# Patient Record
Sex: MALE | Race: WHITE | NOT HISPANIC OR LATINO | URBAN - METROPOLITAN AREA
[De-identification: names, ages, dates, MRNs, and addresses within clinical notes are randomized per-mention and may not be internally consistent; named-entity substitution may affect disease eponyms.]

---

## 2018-08-01 ENCOUNTER — OUTPATIENT (OUTPATIENT)
Dept: OUTPATIENT SERVICES | Facility: HOSPITAL | Age: 48
LOS: 1 days | End: 2018-08-01
Payer: MEDICAID

## 2018-08-01 ENCOUNTER — EMERGENCY (EMERGENCY)
Facility: HOSPITAL | Age: 48
LOS: 1 days | Discharge: HOME | End: 2018-08-01
Attending: EMERGENCY MEDICINE | Admitting: EMERGENCY MEDICINE
Payer: MEDICAID

## 2018-08-01 VITALS
RESPIRATION RATE: 16 BRPM | SYSTOLIC BLOOD PRESSURE: 139 MMHG | OXYGEN SATURATION: 100 % | DIASTOLIC BLOOD PRESSURE: 89 MMHG | HEART RATE: 97 BPM | TEMPERATURE: 99 F

## 2018-08-01 DIAGNOSIS — F10.10 ALCOHOL ABUSE, UNCOMPLICATED: ICD-10-CM

## 2018-08-01 DIAGNOSIS — F32.9 MAJOR DEPRESSIVE DISORDER, SINGLE EPISODE, UNSPECIFIED: ICD-10-CM

## 2018-08-01 LAB
ALBUMIN SERPL ELPH-MCNC: 4.9 G/DL — SIGNIFICANT CHANGE UP (ref 3.3–5)
ALP SERPL-CCNC: 153 U/L — HIGH (ref 40–120)
ALT FLD-CCNC: 105 U/L — HIGH (ref 4–41)
AMPHET UR-MCNC: NEGATIVE — SIGNIFICANT CHANGE UP
APAP SERPL-MCNC: < 15 UG/ML — LOW (ref 15–25)
APPEARANCE UR: CLEAR — SIGNIFICANT CHANGE UP
AST SERPL-CCNC: 180 U/L — HIGH (ref 4–40)
BARBITURATES UR SCN-MCNC: NEGATIVE — SIGNIFICANT CHANGE UP
BASOPHILS # BLD AUTO: 0.07 K/UL — SIGNIFICANT CHANGE UP (ref 0–0.2)
BASOPHILS NFR BLD AUTO: 1.1 % — SIGNIFICANT CHANGE UP (ref 0–2)
BENZODIAZ UR-MCNC: NEGATIVE — SIGNIFICANT CHANGE UP
BILIRUB SERPL-MCNC: 0.4 MG/DL — SIGNIFICANT CHANGE UP (ref 0.2–1.2)
BILIRUB UR-MCNC: NEGATIVE — SIGNIFICANT CHANGE UP
BLOOD UR QL VISUAL: NEGATIVE — SIGNIFICANT CHANGE UP
BUN SERPL-MCNC: 3 MG/DL — LOW (ref 7–23)
CALCIUM SERPL-MCNC: 9 MG/DL — SIGNIFICANT CHANGE UP (ref 8.4–10.5)
CANNABINOIDS UR-MCNC: POSITIVE — SIGNIFICANT CHANGE UP
CHLORIDE SERPL-SCNC: 103 MMOL/L — SIGNIFICANT CHANGE UP (ref 98–107)
CO2 SERPL-SCNC: 24 MMOL/L — SIGNIFICANT CHANGE UP (ref 22–31)
COCAINE METAB.OTHER UR-MCNC: NEGATIVE — SIGNIFICANT CHANGE UP
COLOR SPEC: SIGNIFICANT CHANGE UP
CREAT SERPL-MCNC: 0.58 MG/DL — SIGNIFICANT CHANGE UP (ref 0.5–1.3)
EOSINOPHIL # BLD AUTO: 0.08 K/UL — SIGNIFICANT CHANGE UP (ref 0–0.5)
EOSINOPHIL NFR BLD AUTO: 1.2 % — SIGNIFICANT CHANGE UP (ref 0–6)
ETHANOL BLD-MCNC: 238 MG/DL — HIGH
GLUCOSE SERPL-MCNC: 98 MG/DL — SIGNIFICANT CHANGE UP (ref 70–99)
GLUCOSE UR-MCNC: NEGATIVE — SIGNIFICANT CHANGE UP
HCT VFR BLD CALC: 46.6 % — SIGNIFICANT CHANGE UP (ref 39–50)
HGB BLD-MCNC: 15.3 G/DL — SIGNIFICANT CHANGE UP (ref 13–17)
IMM GRANULOCYTES # BLD AUTO: 0.02 # — SIGNIFICANT CHANGE UP
IMM GRANULOCYTES NFR BLD AUTO: 0.3 % — SIGNIFICANT CHANGE UP (ref 0–1.5)
KETONES UR-MCNC: NEGATIVE — SIGNIFICANT CHANGE UP
LEUKOCYTE ESTERASE UR-ACNC: NEGATIVE — SIGNIFICANT CHANGE UP
LYMPHOCYTES # BLD AUTO: 1.94 K/UL — SIGNIFICANT CHANGE UP (ref 1–3.3)
LYMPHOCYTES # BLD AUTO: 29.2 % — SIGNIFICANT CHANGE UP (ref 13–44)
MCHC RBC-ENTMCNC: 30.6 PG — SIGNIFICANT CHANGE UP (ref 27–34)
MCHC RBC-ENTMCNC: 32.8 % — SIGNIFICANT CHANGE UP (ref 32–36)
MCV RBC AUTO: 93.2 FL — SIGNIFICANT CHANGE UP (ref 80–100)
METHADONE UR-MCNC: NEGATIVE — SIGNIFICANT CHANGE UP
MONOCYTES # BLD AUTO: 0.46 K/UL — SIGNIFICANT CHANGE UP (ref 0–0.9)
MONOCYTES NFR BLD AUTO: 6.9 % — SIGNIFICANT CHANGE UP (ref 2–14)
NEUTROPHILS # BLD AUTO: 4.07 K/UL — SIGNIFICANT CHANGE UP (ref 1.8–7.4)
NEUTROPHILS NFR BLD AUTO: 61.3 % — SIGNIFICANT CHANGE UP (ref 43–77)
NITRITE UR-MCNC: NEGATIVE — SIGNIFICANT CHANGE UP
NRBC # FLD: 0 — SIGNIFICANT CHANGE UP
OPIATES UR-MCNC: NEGATIVE — SIGNIFICANT CHANGE UP
OXYCODONE UR-MCNC: NEGATIVE — SIGNIFICANT CHANGE UP
PCP UR-MCNC: NEGATIVE — SIGNIFICANT CHANGE UP
PH UR: 6 — SIGNIFICANT CHANGE UP (ref 4.6–8)
PLATELET # BLD AUTO: 160 K/UL — SIGNIFICANT CHANGE UP (ref 150–400)
PMV BLD: 10.5 FL — SIGNIFICANT CHANGE UP (ref 7–13)
POTASSIUM SERPL-MCNC: 3.9 MMOL/L — SIGNIFICANT CHANGE UP (ref 3.5–5.3)
POTASSIUM SERPL-SCNC: 3.9 MMOL/L — SIGNIFICANT CHANGE UP (ref 3.5–5.3)
PROT SERPL-MCNC: 7.8 G/DL — SIGNIFICANT CHANGE UP (ref 6–8.3)
PROT UR-MCNC: NEGATIVE MG/DL — SIGNIFICANT CHANGE UP
RBC # BLD: 5 M/UL — SIGNIFICANT CHANGE UP (ref 4.2–5.8)
RBC # FLD: 14.6 % — HIGH (ref 10.3–14.5)
SALICYLATES SERPL-MCNC: < 5 MG/DL — LOW (ref 15–30)
SODIUM SERPL-SCNC: 144 MMOL/L — SIGNIFICANT CHANGE UP (ref 135–145)
SP GR SPEC: 1 — LOW (ref 1–1.04)
TSH SERPL-MCNC: 1.47 UIU/ML — SIGNIFICANT CHANGE UP (ref 0.27–4.2)
UROBILINOGEN FLD QL: NORMAL MG/DL — SIGNIFICANT CHANGE UP
WBC # BLD: 6.64 K/UL — SIGNIFICANT CHANGE UP (ref 3.8–10.5)
WBC # FLD AUTO: 6.64 K/UL — SIGNIFICANT CHANGE UP (ref 3.8–10.5)
WBC UR QL: SIGNIFICANT CHANGE UP (ref 0–?)

## 2018-08-01 PROCEDURE — 90792 PSYCH DIAG EVAL W/MED SRVCS: CPT | Mod: GC

## 2018-08-01 PROCEDURE — 99283 EMERGENCY DEPT VISIT LOW MDM: CPT

## 2018-08-01 NOTE — ED BEHAVIORAL HEALTH ASSESSMENT NOTE - CASE SUMMARY
IN BRIEF, 48 yo M with longstanding alcoholism and objective signs of liver damage, presenting with worsening depressive symptoms.  States that he feels very stressed at home caring for father and also reports that he wants to get help for his drinking problems  States that he felt desperate earlier today but feels safe now.  MSE is notable for his being clinical sober, despite having an elevated BAL.  Pleasant, red, rashes on skin,no PMA or PMR, mood "ok" affect: normothymic, somewhat constricted, no current SI, HI, AH or VH. Some insight and judgment.  Plan is for discharge and  referral for outpatient behavioral health.

## 2018-08-01 NOTE — ED BEHAVIORAL HEALTH ASSESSMENT NOTE - SUMMARY
46yo M single, domiciled with elderly father, unemployed (previously at [employer]), with no PMHx, EtOH abuse (drinks 14 beers per day, blood etoh 238 in the ED, elevated LFTs ), with no formal PPHx (no prior hospitalizations, no hx of SAs, no previous med trials) but saw a therapist many years ago and superficially cut his abdomen 8yrs ago (NSSIB), BIB EMS activated by self after he put a knife to his heart 2/2 SI also in the context of EtOH intoxication and several psychosocial stressors. Pt is clearly depressed but lacks suicidal intent and is help-seeking/future-oriented. He is open to both psychiatric and substance use treatment. No indication for psychiatric hospitalization, will discharge home and refer for outpt f/u.

## 2018-08-01 NOTE — ED PROVIDER NOTE - OBJECTIVE STATEMENT
pt here with suicidal ideation.  Was going to cut himself with a knife-  held knife to his chest.   has cut himself on one prior occasion.  he notes depression over failure to be able to obtain help from his family in caring for his father  denies medical history but drinks daily.  No h/o withdrawl

## 2018-08-01 NOTE — ED BEHAVIORAL HEALTH ASSESSMENT NOTE - SAFETY PLAN DETAILS
pt agreeable to go to [facility], call 911, or return to the ED with any imminent safety concerns

## 2018-08-01 NOTE — ED BEHAVIORAL HEALTH ASSESSMENT NOTE - DESCRIPTION
calm and cooperative, tearful at times    Vital Signs Last 24 Hrs  T(C): 37.2 (01 Aug 2018 19:56), Max: 37.2 (01 Aug 2018 19:56)  T(F): 99 (01 Aug 2018 19:56), Max: 99 (01 Aug 2018 19:56)  HR: 97 (01 Aug 2018 19:56) (97 - 97)  BP: 139/89 (01 Aug 2018 19:56) (139/89 - 139/89)  BP(mean): --  RR: 16 (01 Aug 2018 19:56) (16 - 16)  SpO2: 100% (01 Aug 2018 19:56) (100% - 100%) none as per HPI

## 2018-08-01 NOTE — ED PROVIDER NOTE - MEDICAL DECISION MAKING DETAILS
Pt with Pt with  suicidal ideation  pt also with active ETOH abuse  screening labs sent psych consulted

## 2018-08-01 NOTE — ED ADULT NURSE NOTE - OBJECTIVE STATEMENT
RICHARD GRESHAM, pt called 911 for suicidal thoughts and depressed feelings. As per EMS and pt, he held a knife to his chest. Pt reports feeling "stressed" r/t multiple "family stressors". Pt reports chronic ETOH intake, daily "multiple beers" for 30 plus years". Pt reports last drink was 2pm this afternoon, denies Hx of seizures or w/d, states "I have never really been sober enough to withdraw". Pt reports prior hx of SA by stabbing self in the stomach years prior with 1-2 prior psychiatric admissions.

## 2018-08-01 NOTE — ED BEHAVIORAL HEALTH ASSESSMENT NOTE - SUICIDE PROTECTIVE FACTORS
Future oriented/Identifies reasons for living/Responsibility to family and others/Supportive social network or family

## 2018-08-01 NOTE — ED BEHAVIORAL HEALTH NOTE - BEHAVIORAL HEALTH NOTE
met with the patient and provided referrals and psychoeducation for substance abuse services. He identifed [facility] as a suitable program, and stated he will call for an appointment there.  sent and email to staff there informing them of same. The evaluating psychiatrist provided the patient with a  referral to the [facility], which the patient stated he will go to tomorrow. He stated he has the means to get home on his own.

## 2018-08-01 NOTE — ED ADULT NURSE NOTE - NSIMPLEMENTINTERV_GEN_ALL_ED
Implemented All Universal Safety Interventions:  Lynn to call system. Call bell, personal items and telephone within reach. Instruct patient to call for assistance. Room bathroom lighting operational. Non-slip footwear when patient is off stretcher. Physically safe environment: no spills, clutter or unnecessary equipment. Stretcher in lowest position, wheels locked, appropriate side rails in place.

## 2018-08-01 NOTE — ED ADULT NURSE REASSESSMENT NOTE - GENERAL PATIENT STATE
comfortable appearance/cooperative/pt remains awake, a&ox3, calm. Denies current suicidal thoughts or intent to harm self.

## 2018-08-01 NOTE — ED BEHAVIORAL HEALTH ASSESSMENT NOTE - HPI (INCLUDE ILLNESS QUALITY, SEVERITY, DURATION, TIMING, CONTEXT, MODIFYING FACTORS, ASSOCIATED SIGNS AND SYMPTOMS)
Pt is a 46yo M single, domiciled with elderly father, unemployed (previously at [employer]), with no PMHx, EtOH abuse (drinks 14 beers per day), with no formal PPHx (no prior hospitalizations, no hx of SAs, no previous med trials) but saw a therapist approx 8 years ago and superficially cut his abdomen 8yrs ago (NSSIB), BIB EMS activated by self after he put a knife to his heart 2/2 SI also in the context of EtOH intoxication. Pt states that for the past several years he has been feeling very overwhelmed and isolated/alone caring for his father. Has chronic conflict with his sister and other family members, who have not been supportive or helpful. Reports feeling depressed everyday for several months although does have moments of happiness, enjoys watching soccer and recent world cup. No longer enjoys playing the guitar or cooking. Reports losing approx 50lbs in the past 8 months due to decreased appetite. Sleeping poorly (2:00am - 10:30am, but wakes frequently during the night). Reports chronic SI for many years but states he would never actually kill himself. States he's very close to his father, who is quite supportive. Pt hopeful to get better and despite perceived stigma about psychiatry, is open to engaging in outpt treatment.

## 2018-08-01 NOTE — ED BEHAVIORAL HEALTH ASSESSMENT NOTE - RISK ASSESSMENT
Risk factors include depression, chronic SI (w/ suicidal gesture tonight - put knife to heart), EtOH abuse, and few social supports. Protective factors include pt denies any suicidal intent, has no hx of psych hospitalizations, is future-oriented and hopeful, is help-seeking (called 911 himself, open to referral for outpt psychiatric/substance tx), and has very supportive father and stable domicile. Protective factors mitigate risks and pt is appropriate for outpt referral.

## 2018-08-01 NOTE — ED PROVIDER NOTE - PROGRESS NOTE DETAILS
pt seen by psych  cleared  pt will follow up with ETOH abuse program  pt presently calm cooperative  nl HR  no tremor stable gait  clear coherent speech

## 2018-08-01 NOTE — ED ADULT TRIAGE NOTE - TEMPERATURE IN CELSIUS (DEGREES C)
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     -patient has no C Diff risk factors; denies recent abx or hospitalizations.   -follow up pending blood cultures.   -serial abdominal examinations and stool count 37.2

## 2018-08-13 ENCOUNTER — EMERGENCY (EMERGENCY)
Facility: HOSPITAL | Age: 48
LOS: 1 days | Discharge: ANOTHER HOSPITAL | End: 2018-08-13
Attending: EMERGENCY MEDICINE | Admitting: INTERNAL MEDICINE
Payer: MEDICAID

## 2018-08-13 VITALS
RESPIRATION RATE: 18 BRPM | SYSTOLIC BLOOD PRESSURE: 147 MMHG | HEART RATE: 85 BPM | TEMPERATURE: 99 F | DIASTOLIC BLOOD PRESSURE: 92 MMHG | OXYGEN SATURATION: 100 %

## 2018-08-13 DIAGNOSIS — F10.10 ALCOHOL ABUSE, UNCOMPLICATED: ICD-10-CM

## 2018-08-13 DIAGNOSIS — F10.230 ALCOHOL DEPENDENCE WITH WITHDRAWAL, UNCOMPLICATED: ICD-10-CM

## 2018-08-13 DIAGNOSIS — R45.851 SUICIDAL IDEATIONS: ICD-10-CM

## 2018-08-13 DIAGNOSIS — R69 ILLNESS, UNSPECIFIED: ICD-10-CM

## 2018-08-13 DIAGNOSIS — Z29.9 ENCOUNTER FOR PROPHYLACTIC MEASURES, UNSPECIFIED: ICD-10-CM

## 2018-08-13 DIAGNOSIS — T14.91XA SUICIDE ATTEMPT, INITIAL ENCOUNTER: ICD-10-CM

## 2018-08-13 DIAGNOSIS — F10.982 ALCOHOL USE, UNSPECIFIED WITH ALCOHOL-INDUCED SLEEP DISORDER: ICD-10-CM

## 2018-08-13 DIAGNOSIS — F39 UNSPECIFIED MOOD [AFFECTIVE] DISORDER: ICD-10-CM

## 2018-08-13 DIAGNOSIS — Z98.890 OTHER SPECIFIED POSTPROCEDURAL STATES: Chronic | ICD-10-CM

## 2018-08-13 LAB
ALBUMIN SERPL ELPH-MCNC: 4.6 G/DL — SIGNIFICANT CHANGE UP (ref 3.3–5)
ALP SERPL-CCNC: 145 U/L — HIGH (ref 40–120)
ALT FLD-CCNC: 89 U/L — HIGH (ref 4–41)
APAP SERPL-MCNC: < 15 UG/ML — LOW (ref 15–25)
AST SERPL-CCNC: 118 U/L — HIGH (ref 4–40)
BASOPHILS # BLD AUTO: 0.05 K/UL — SIGNIFICANT CHANGE UP (ref 0–0.2)
BASOPHILS NFR BLD AUTO: 1 % — SIGNIFICANT CHANGE UP (ref 0–2)
BILIRUB SERPL-MCNC: 0.5 MG/DL — SIGNIFICANT CHANGE UP (ref 0.2–1.2)
BUN SERPL-MCNC: 4 MG/DL — LOW (ref 7–23)
CALCIUM SERPL-MCNC: 8.7 MG/DL — SIGNIFICANT CHANGE UP (ref 8.4–10.5)
CHLORIDE SERPL-SCNC: 102 MMOL/L — SIGNIFICANT CHANGE UP (ref 98–107)
CO2 SERPL-SCNC: 23 MMOL/L — SIGNIFICANT CHANGE UP (ref 22–31)
CREAT SERPL-MCNC: 0.59 MG/DL — SIGNIFICANT CHANGE UP (ref 0.5–1.3)
EOSINOPHIL # BLD AUTO: 0.02 K/UL — SIGNIFICANT CHANGE UP (ref 0–0.5)
EOSINOPHIL NFR BLD AUTO: 0.4 % — SIGNIFICANT CHANGE UP (ref 0–6)
ETHANOL BLD-MCNC: 236 MG/DL — HIGH
GLUCOSE SERPL-MCNC: 95 MG/DL — SIGNIFICANT CHANGE UP (ref 70–99)
HCT VFR BLD CALC: 45.5 % — SIGNIFICANT CHANGE UP (ref 39–50)
HGB BLD-MCNC: 14.9 G/DL — SIGNIFICANT CHANGE UP (ref 13–17)
IMM GRANULOCYTES # BLD AUTO: 0.01 # — SIGNIFICANT CHANGE UP
IMM GRANULOCYTES NFR BLD AUTO: 0.2 % — SIGNIFICANT CHANGE UP (ref 0–1.5)
LYMPHOCYTES # BLD AUTO: 1.44 K/UL — SIGNIFICANT CHANGE UP (ref 1–3.3)
LYMPHOCYTES # BLD AUTO: 27.9 % — SIGNIFICANT CHANGE UP (ref 13–44)
MCHC RBC-ENTMCNC: 30.3 PG — SIGNIFICANT CHANGE UP (ref 27–34)
MCHC RBC-ENTMCNC: 32.7 % — SIGNIFICANT CHANGE UP (ref 32–36)
MCV RBC AUTO: 92.7 FL — SIGNIFICANT CHANGE UP (ref 80–100)
MONOCYTES # BLD AUTO: 0.4 K/UL — SIGNIFICANT CHANGE UP (ref 0–0.9)
MONOCYTES NFR BLD AUTO: 7.8 % — SIGNIFICANT CHANGE UP (ref 2–14)
NEUTROPHILS # BLD AUTO: 3.24 K/UL — SIGNIFICANT CHANGE UP (ref 1.8–7.4)
NEUTROPHILS NFR BLD AUTO: 62.7 % — SIGNIFICANT CHANGE UP (ref 43–77)
NRBC # FLD: 0 — SIGNIFICANT CHANGE UP
PLATELET # BLD AUTO: 148 K/UL — LOW (ref 150–400)
PMV BLD: 10.1 FL — SIGNIFICANT CHANGE UP (ref 7–13)
POTASSIUM SERPL-MCNC: 3.7 MMOL/L — SIGNIFICANT CHANGE UP (ref 3.5–5.3)
POTASSIUM SERPL-SCNC: 3.7 MMOL/L — SIGNIFICANT CHANGE UP (ref 3.5–5.3)
PROT SERPL-MCNC: 7.1 G/DL — SIGNIFICANT CHANGE UP (ref 6–8.3)
RBC # BLD: 4.91 M/UL — SIGNIFICANT CHANGE UP (ref 4.2–5.8)
RBC # FLD: 14.8 % — HIGH (ref 10.3–14.5)
SALICYLATES SERPL-MCNC: < 5 MG/DL — LOW (ref 15–30)
SODIUM SERPL-SCNC: 145 MMOL/L — SIGNIFICANT CHANGE UP (ref 135–145)
TSH SERPL-MCNC: 1.83 UIU/ML — SIGNIFICANT CHANGE UP (ref 0.27–4.2)
WBC # BLD: 5.16 K/UL — SIGNIFICANT CHANGE UP (ref 3.8–10.5)
WBC # FLD AUTO: 5.16 K/UL — SIGNIFICANT CHANGE UP (ref 3.8–10.5)

## 2018-08-13 PROCEDURE — 99285 EMERGENCY DEPT VISIT HI MDM: CPT

## 2018-08-13 RX ORDER — TRAZODONE HCL 50 MG
100 TABLET ORAL AT BEDTIME
Qty: 0 | Refills: 0 | Status: DISCONTINUED | OUTPATIENT
Start: 2018-08-13 | End: 2018-08-14

## 2018-08-13 RX ORDER — FOLIC ACID 0.8 MG
1 TABLET ORAL DAILY
Qty: 0 | Refills: 0 | Status: DISCONTINUED | OUTPATIENT
Start: 2018-08-13 | End: 2018-08-14

## 2018-08-13 RX ORDER — FOLIC ACID 0.8 MG
1 TABLET ORAL ONCE
Qty: 0 | Refills: 0 | Status: COMPLETED | OUTPATIENT
Start: 2018-08-13 | End: 2018-08-13

## 2018-08-13 RX ORDER — SODIUM CHLORIDE 9 MG/ML
1000 INJECTION, SOLUTION INTRAVENOUS
Qty: 0 | Refills: 0 | Status: DISCONTINUED | OUTPATIENT
Start: 2018-08-13 | End: 2018-08-14

## 2018-08-13 RX ORDER — THIAMINE MONONITRATE (VIT B1) 100 MG
100 TABLET ORAL DAILY
Qty: 0 | Refills: 0 | Status: DISCONTINUED | OUTPATIENT
Start: 2018-08-13 | End: 2018-08-14

## 2018-08-13 RX ORDER — THIAMINE MONONITRATE (VIT B1) 100 MG
100 TABLET ORAL ONCE
Qty: 0 | Refills: 0 | Status: COMPLETED | OUTPATIENT
Start: 2018-08-13 | End: 2018-08-13

## 2018-08-13 RX ORDER — ONDANSETRON 8 MG/1
4 TABLET, FILM COATED ORAL ONCE
Qty: 0 | Refills: 0 | Status: COMPLETED | OUTPATIENT
Start: 2018-08-13 | End: 2018-08-13

## 2018-08-13 RX ORDER — POTASSIUM CHLORIDE 20 MEQ
40 PACKET (EA) ORAL ONCE
Qty: 0 | Refills: 0 | Status: DISCONTINUED | OUTPATIENT
Start: 2018-08-13 | End: 2018-08-13

## 2018-08-13 RX ADMIN — Medication 50 MILLIGRAM(S): at 18:58

## 2018-08-13 RX ADMIN — Medication 1 TABLET(S): at 18:58

## 2018-08-13 RX ADMIN — Medication 100 MILLIGRAM(S): at 18:58

## 2018-08-13 RX ADMIN — SODIUM CHLORIDE 100 MILLILITER(S): 9 INJECTION, SOLUTION INTRAVENOUS at 18:58

## 2018-08-13 RX ADMIN — Medication 1 MILLIGRAM(S): at 18:58

## 2018-08-13 RX ADMIN — ONDANSETRON 4 MILLIGRAM(S): 8 TABLET, FILM COATED ORAL at 18:58

## 2018-08-13 NOTE — ED ADULT TRIAGE NOTE - CHIEF COMPLAINT QUOTE
was at a hotel and tried to kill self and was drinking all day yesterday until today was at a hotel and tried to kill self and was drinking all day yesterday until today red marks noted right side of neck

## 2018-08-13 NOTE — H&P ADULT - HISTORY OF PRESENT ILLNESS
46 y/o M presents after suicide attempt.  Pt attempted to hang himself with a belt earlier today, then called EMS for himself.  Pt reports feeling suicidal for "a long time" but particularly over the past 1-2 weeks.  He has contemplated stabbing himself in the chest.  He has a past suicide attempt several years ago when he stabbed himself in the stomach.  He has felt very depressed recently.  He had been living with his elderly father who he helps care for until a few days ago, when his father did not allow him back into his home.  Pt has been staying at a hotel for the past few days.  He reports drinking daily for the past decade, and in recent years, has been drinking 10-12 beers daily.  He has been afraid to stop 2/2 fear of withdrawal, but has never experienced withdrawal symptoms.  His last drink was this am.  Pt was previously diagnosed with bipolar disorder, but never followed up, and never was on medications.      Pt, on arrival to the ED was experiencing tremors and retching.  He had CIWA score of 7, and was given librium with relief of symptoms.  He denies AVH or tactile disturbance.

## 2018-08-13 NOTE — ED BEHAVIORAL HEALTH ASSESSMENT NOTE - DETAILS
8 yrs ago cut his abdomen superficially (while intoxicated?) [facility] Psychiatry Team received notification to follow up with Patient tomorrow self-referred; patient informed of need for eventual psychiatric admission which he seemingly accepted

## 2018-08-13 NOTE — ED ADULT TRIAGE NOTE - NS_BH TRG QUESTION8_ED_ALL_ED
Anxiety (includes Panic, OCD)/Depression (without Suicidality or Psychosis)/Kayleen (includes Bipolar Disorder)

## 2018-08-13 NOTE — H&P ADULT - NSHPPHYSICALEXAM_GEN_ALL_CORE
Vital Signs Last 24 Hrs  T(C): 37.1 (13 Aug 2018 10:57), Max: 37.1 (13 Aug 2018 10:57)  T(F): 98.8 (13 Aug 2018 10:57), Max: 98.8 (13 Aug 2018 10:57)  HR: 73 (13 Aug 2018 19:03) (73 - 94)  BP: 117/63 (13 Aug 2018 19:03) (117/63 - 165/89)  BP(mean): --  RR: 18 (13 Aug 2018 19:03) (16 - 18)  SpO2: 99% (13 Aug 2018 19:03) (99% - 100%)    PHYSICAL EXAM:  GENERAL: No Acute Distress  EYES: conjunctiva and sclera clear  ENMT: Moist mucous membranes   NECK: Supple  CHEST/LUNG: Clear to auscultation bilaterally  HEART: Regular rate and rhythm; No murmurs, rubs, or gallops  ABDOMEN: Soft, Nontender, Nondistended; Bowel sounds normal  EXTREMITIES:   No clubbing, cyanosis, or pedal edema  PSYCH: depressed, congruent affect  NEUROLOGY:   - Mental status A&O x 3,  - Motor - mild tremor   SKIN: No rashes or lesions  MUSCULOSKELETAL: No joint swelling

## 2018-08-13 NOTE — H&P ADULT - NSHPREVIEWOFSYSTEMS_GEN_ALL_CORE
REVIEW OF SYSTEMS    General:  Negative  Skin/Breast:  Negative  Ophthalmologic:  Negative  ENMT:  Negative  Respiratory and Thorax:  Negative  Cardiovascular:  Negative  Gastrointestinal:  Negative  Genitourinary:  Negative  Musculoskeletal:  Negative  Neurological:  tremor.  no headache  Psychiatric:  Depression, suicidal ideation. anxiety.   no hallucination  Hematology/Lymphatics:  Negative	  Endocrine:	  Negative  Allergic/Immunologic:	 Negative

## 2018-08-13 NOTE — H&P ADULT - ASSESSMENT
48 y/o M with ETOH dependence and history of suicide attempt presents to ED after suicide attempt this am

## 2018-08-13 NOTE — H&P ADULT - FAMILY HISTORY
Sibling  Still living? Unknown  Family history of alcohol abuse, Age at diagnosis: Age Unknown     Mother  Still living? Unknown  Family history of depression, Age at diagnosis: Age Unknown

## 2018-08-13 NOTE — ED PROVIDER NOTE - OBJECTIVE STATEMENT
48yo male presenting following suicide attempt. 46yo male presenting following suicide attempt. Earlier today attempted to hang himself with 46yo male presenting following suicide attempt. Earlier today attempted to hang himself with belt. 2 prior suicide attempts. once stabbed himself in belly years ago and 10 days ago was planning on stabbing himself in chest, but decided against it. He has been living with his father which has been hard, last 2 days father refused to let him in so he has been at hotel. Feels like he has nothing to live for. Currently +SI, wants to die right now. No HI, hallucinations, delusions. Feels like he has constant6 anxiety as well. May have been diagnosed with bipolar years ago, but doesn't remember, doesn't take meds  etoh daily, drinks 18-22 beers per day. Last drink 1.5 hours. ago.   Wants help, called 911 after hanging, willing to talk to psych.

## 2018-08-13 NOTE — ED PROVIDER NOTE - PHYSICAL EXAMINATION
Gen: No acute distress, alert, cooperative  Head: Normocephalic, Atraumatic  HEENT: PERRL, oral mucosa moist, normal conjunctiva, rash around neck(likely from belt)  Lung: CTAB, no respiratory distress, no crackles or wheezes  CV: rrr, no murmur  Abd: soft, NTND, no rebound or guarding  MSK: No LE edema, no visible deformities  Neuro: No focal neurologic deficits  Skin: Warm and dry, no evidence of rash   Psych: normal affect, follows commands, +SI

## 2018-08-13 NOTE — ED BEHAVIORAL HEALTH ASSESSMENT NOTE - CONSEQUENCES
denies hx of withdrawals, seizures (though says he does not know as he does not go long enough without alcohol to withdraw)

## 2018-08-13 NOTE — ED BEHAVIORAL HEALTH ASSESSMENT NOTE - HPI (INCLUDE ILLNESS QUALITY, SEVERITY, DURATION, TIMING, CONTEXT, MODIFYING FACTORS, ASSOCIATED SIGNS AND SYMPTOMS)
PATIENT WAS SEEN BY PSYCHIATRY ON 8/1/18 AT [facility]: Pt is a 46yo  male, single, previously domiciled with elderly father who he is partial caretaker of and currently staying at a hotel, unemployed (previously worked as a ), with 10 year history of Alcohol Abuse, currently drinking up to a case of beer daily (12-14 cans), reports no hx of withdrawal seizures or DTs (but with admits that he has never been abstinent long enough to see if he has any withdrawals), + recreational sporadic Cannabis use, denies other substance/drug use, denies ever having attended detox/rehab/sober houses, with no previous psychiatric hospitalizations, no previous suicide attempts/SIB; no hx of aggression or violence; no known legal issues, + superficially cut his abdomen 8yrs ago (denies subsequent SIB), saw a therapist briefly also 8 years ago, who called EMS on himself for suicidality.  at 12:30pm.     Patient was seen after sufficient time elapsed for sobriety (BAL to be well below < 100). Patient has faint odor of alcohol. He is calm, cooperative, nearly tearful and reports that he feels much worst since last seen 2 weeks ago at [facility]. In the interim, he left his father's house because "I just could not take it anymore" and is paying for a hotel room for himself. Patient says that he may not even be let back into his father's house but would not give details as to what happened, Basically, he had a fall out with his brother and rest of the family. Patient reports that he has continued his usual pattern of drinking which is about a case of beer daily (~ 12-14 cans). Patient drank earlier today, started to have suicidal thoughts which he reports to have acted on by getting his belt out, fashioned a noose and closed its end on a window. He said he put the noose around his neck, pulled, the  passed out (he said due to the amount of alcohol he drank) for an unknown amount of time. When he woke up again, he called 911 on himself.     Patient states that for the past several years he has been feeling very overwhelmed and isolated/alone caring for his father. Has chronic conflict with his sister and other family members, who have not been supportive or helpful. Reports feeling depressed everyday for several months although does have moments of happiness but none for the last 2 weeks. No longer enjoys playing the guitar or cooking. Reports losing tens of pounds in the past 8 months due to decreased appetite. He reports poor sleep (usually falling asleep at 2:00am to 10:30am with frequent awakenings during the night). Reports chronic suicidality (on/off thoughts without definite plan or intent) for many years but denies any attempts.    Patient at this time continues to endorse low mood, hopelessness, feelings of guilt and suicidal ideation (denies active intent or plan in the ED but states he cannot contract for safety if discharged). Patient tearful and says that "I really need to detox from alcohol and try something" and acknowledges that it has become a major problem/issue for him. Patient at this time expresses understanding that he would need to be admitted to inpatient psychiatry. Patient also informed that due to bed shortages, he may have to board/stay in the ED overnight. He said "ok." PATIENT WAS SEEN BY PSYCHIATRY ON 8/1/18 AT [facility]: Pt is a 48yo  male, single, previously domiciled with elderly father who he is partial caretaker of and currently staying at a hotel, unemployed (previously worked as a ), with 10 year history of Alcohol Abuse, currently drinking up to a case of beer daily (12-14 cans), reports no hx of withdrawal seizures or DTs (but with admits that he has never been abstinent long enough to see if he has any withdrawals), + recreational sporadic Cannabis use, denies other substance/drug use, denies ever having attended detox/rehab/sober houses, with no previous psychiatric hospitalizations, no previous suicide attempts/SIB; no hx of aggression or violence; no known legal issues, + superficially cut his abdomen 8yrs ago (denies subsequent SIB), saw a therapist briefly also 8 years ago, who called EMS on himself for suicidality.  at 12:30pm. On triage, red marks noted right side of neck.      Patient was seen after sufficient time elapsed for sobriety (BAL to be well below < 100). Patient has faint odor of alcohol. He is calm, cooperative, nearly tearful and reports that he feels much worst since last seen 2 weeks ago at [facility]. In the interim, he left his father's house because "I just could not take it anymore" and is paying for a hotel room for himself. Patient says that he may not even be let back into his father's house but would not give details as to what happened, Basically, he had a fall out with his brother and rest of the family. Patient reports that he has continued his usual pattern of drinking which is about a case of beer daily (~ 12-14 cans). Patient drank earlier today, started to have suicidal thoughts which he reports to have acted on by getting his belt out, fashioned a noose and closed its end on a window. He said he put the noose around his neck, pulled, the  passed out (he said due to the amount of alcohol he drank) for an unknown amount of time. When he woke up again, he called 911 on himself.     Patient states that for the past several years he has been feeling very overwhelmed and isolated/alone caring for his father. Has chronic conflict with his sister and other family members, who have not been supportive or helpful. Reports feeling depressed everyday for several months although does have moments of happiness but none for the last 2 weeks. No longer enjoys playing the guitar or cooking. Reports losing tens of pounds in the past 8 months due to decreased appetite. He reports poor sleep (usually falling asleep at 2:00am to 10:30am with frequent awakenings during the night). Reports chronic suicidality (on/off thoughts without definite plan or intent) for many years but denies any attempts.    Patient at this time continues to endorse low mood, hopelessness, feelings of guilt and suicidal ideation (denies active intent or plan in the ED but states he cannot contract for safety if discharged). Patient tearful and says that "I really need to detox from alcohol and try something" and acknowledges that it has become a major problem/issue for him. Patient at this time expresses understanding that he would need to be admitted to inpatient psychiatry. Patient also informed that due to bed shortages, he may have to board/stay in the ED overnight. He said "ok."

## 2018-08-13 NOTE — ED ADULT TRIAGE NOTE - NS_BH TRG QUESTION7_ED_ALL_ED
Depression (without Suicidality or Psychosis)/Kayleen (includes Bipolar Disorder)/Anxiety (includes Panic, OCD)

## 2018-08-13 NOTE — ED BEHAVIORAL HEALTH ASSESSMENT NOTE - SUMMARY
Pt is a 46yo male with 10 year history of Alcohol Abuse, continuous, reports no complications/withdrawals/seizures, recreational Cannabis user (UTOX "+" for THC today) presenting s/p suicide attempt via tying a belt around his neck in the context of alcohol intoxication and secondary to psychosocial stressors (arguing with his family). Patient reports months of preceding gradual worsened mood and insomnia. Patient still reported suicidality and hopelessness when sobriety was achieved. Patient at this time also reporting wish to "detox from alcohol." Unable to go to a detox/rehab at this time due to the suicidality. Patient to be admitted to medicine for alcohol withdrawal management and will be followed by  Psychiatry with much likely subsequent admission for inpatient psychiatric treatment.

## 2018-08-13 NOTE — ED BEHAVIORAL HEALTH ASSESSMENT NOTE - OTHER
currently staying at a hotel CVM deferred at this time on the softer side but audible maintains with some effort unable to tolerate an MMSE at this time

## 2018-08-13 NOTE — ED BEHAVIORAL HEALTH ASSESSMENT NOTE - SUICIDE RISK FACTORS
Mood episode/Anhedonia/Highly impulsive behavior/Substance abuse/dependence/Unable to engage in safety planning

## 2018-08-13 NOTE — H&P ADULT - NSHPSOCIALHISTORY_GEN_ALL_CORE
no tobacco  occasional marijuana smoking  ETOH daily as described above  unemployed   living with father until recently

## 2018-08-13 NOTE — ED ADULT NURSE NOTE - NS_BH TRG QUESTION8_ED_ALL_ED
Kayleen (includes Bipolar Disorder)/Anxiety (includes Panic, OCD)/Depression (without Suicidality or Psychosis)

## 2018-08-13 NOTE — ED BEHAVIORAL HEALTH ASSESSMENT NOTE - SUBSTANCE ISSUES AND PLAN (INCLUDE STANDING AND PRN MEDICATION)
place on CIWA protocol place on CIWA protocol; would opt for symptom-triggered CIWA with Ativan 2mg PO or IM q1hrs PRN for Symptom-triggered: each CIWA -Ar score 8 or GREATER; would give Ativan 3mg PO qhs this evening around 8pm;

## 2018-08-13 NOTE — ED BEHAVIORAL HEALTH ASSESSMENT NOTE - RISK ASSESSMENT
Currently at very high risk to self given recent suicide attempt, continued alcohol/substance abuse, single, male gender, recent losses (left father's house and living in hotel), reports months of worsening mood with insomnia, anhedonia, weight loss, feelings of guilt and hopelessness who needs inpatient level of care at this time.

## 2018-08-13 NOTE — ED ADULT NURSE NOTE - OBJECTIVE STATEMENT
pt received to room #17 s/p suicide attempt. pt states he is a chronic alcoholic who up until 48 hrs ago was living with his father. states he has been feeling depressed for a while, was seen here in the behavorial health ED for suicide attempt pt received to room #17 s/p suicide attempt. pt states he is a chronic alcoholic who up until 48 hrs ago was living with his father. states he has been feeling depressed for a while, was seen here in the behavorial health ED for suicide attempt. admits to drinking 1 case (approx 18-22 beers) of beer, and one bottle of rum. last drink 2 hrs ago. pt states he attempted to hang himself with a belt by tying it around his neck and closing one end in window. when attempt was unsuccessful he called 911 for help. bruising noted to the right anterior neck. Full ROM noted to head and neck. no difficulty swallowing noted, pt able to tolerate own secretions, no hoarseness noted to the voice, pt speaking in full sentences. pt denies cp, sob, n/v/d and urinary symptoms. denies hi/ah/vh, tremors, headache and visual changes. on HM, NSR. pt placed on 1:1 observation. PCA at bedside. Clothing labeled and placed in locker by room #21, wallet placed in secure bag and brought to security, yellow receipt in chart. pt seen by MD. will cont to monitor.

## 2018-08-13 NOTE — ED ADULT NURSE NOTE - NSIMPLEMENTINTERV_GEN_ALL_ED
Implemented All Fall with Harm Risk Interventions:  [place] to call system. Call bell, personal items and telephone within reach. Instruct patient to call for assistance. Room bathroom lighting operational. Non-slip footwear when patient is off stretcher. Physically safe environment: no spills, clutter or unnecessary equipment. Stretcher in lowest position, wheels locked, appropriate side rails in place. Provide visual cue, wrist band, yellow gown, etc. Monitor gait and stability. Monitor for mental status changes and reorient to person, place, and time. Review medications for side effects contributing to fall risk. Reinforce activity limits and safety measures with patient and family. Provide visual clues: red socks.

## 2018-08-13 NOTE — ED BEHAVIORAL HEALTH ASSESSMENT NOTE - PSYCHIATRIC ISSUES AND PLAN (INCLUDE STANDING AND PRN MEDICATION)
PATIENT CANNOT SIGN OUT AMA; recommending trazodone 100mg PO qhs prn for insomnia, folic acid, Vitamin B12, hydration, Ativan 2mg PO q4-6 hrs for anxiety/agitation; haldol 5mg IM q4-6hrs for severe agitation/aggression

## 2018-08-13 NOTE — ED PROVIDER NOTE - MEDICAL DECISION MAKING DETAILS
48yo male presenting following suicide attempt by hanging. Current SI. Wants help. Medically clear, CTA neck, psych.

## 2018-08-13 NOTE — H&P ADULT - PROBLEM SELECTOR PLAN 1
unclear if major depression, bipolar d/o or other Axis 1 d/o  - psych consult reviewed  - will follow up further recs from [facility] psych  - 1:1 observation

## 2018-08-13 NOTE — ED PROVIDER NOTE - PROGRESS NOTE DETAILS
AK: Discussed with hospitalist for admission. No beds at [facility]. Will board with medicine until bed available. Started on librium. Paging MAR. 1:1.

## 2018-08-13 NOTE — ED BEHAVIORAL HEALTH ASSESSMENT NOTE - DESCRIPTION
calm and cooperative, tearful at times none reported used to work as a ; has been unemployed for a while. likes to play [phone]

## 2018-08-13 NOTE — H&P ADULT - NSHPLABSRESULTS_GEN_ALL_CORE
08-13    145  |  102  |  4<L>  ----------------------------<  95  3.7   |  23  |  0.59    Ca    8.7      13 Aug 2018 12:27    TPro  7.1  /  Alb  4.6  /  TBili  0.5  /  DBili  x   /  AST  118<H>  /  ALT  89<H>  /  AlkPhos  145<H>  08-13                            14.9   5.16  )-----------( 148      ( 13 Aug 2018 12:27 )             45.5

## 2018-08-13 NOTE — ED PROVIDER NOTE - ATTENDING CONTRIBUTION TO CARE
DR. BLOCH, ATTENDING MD-  I performed a face to face bedside interview with patient regarding history of present illness, review of symptoms and past medical history. I completed an independent physical exam.  I have discussed patient's plan of care with the resident.  Patient examined, WEll appearing, NML voice, red bruising/petichiae, right side of neck, no swelling or tenderness, trachea midline, no thyroid cartilage tenderness, no crepitation. can swallow saliva. heart sounds nml Lungs clear, abd soft, extrem no edema.

## 2018-08-13 NOTE — ED BEHAVIORAL HEALTH ASSESSMENT NOTE - OTHER PAST PSYCHIATRIC HISTORY (INCLUDE DETAILS REGARDING ONSET, COURSE OF ILLNESS, INPATIENT/OUTPATIENT TREATMENT)
no previous psychiatric hospitalizations, no previous suicide attempts/SIB; no hx of aggression or violence; no known legal issues, + superficially cut his abdomen 8yrs ago (denies subsequent SIB), saw a therapist briefly also 8 years ago

## 2018-08-13 NOTE — H&P ADULT - PROBLEM SELECTOR PLAN 3
IMPROVE VTE Individual Risk Assessment          RISK                                                          Points  [  ] Previous VTE                                                3  [  ] Thrombophilia                                             2  [  ] Lower limb paralysis                                   2        (unable to hold up >15 seconds)    [  ] Current Cancer                                             2         (within 6 months)  [  ] Immobilization > 24 hrs                              1  [  ] ICU/CCU stay > 24 hours                             1  [  ] Age > 60                                                         1    IMPROVE VTE Score: 0

## 2018-08-13 NOTE — ED ADULT NURSE NOTE - CHIEF COMPLAINT QUOTE
was at a hotel and tried to kill self and was drinking all day yesterday until today red marks noted right side of neck

## 2018-08-14 ENCOUNTER — INPATIENT (INPATIENT)
Facility: HOSPITAL | Age: 48
LOS: 8 days | Discharge: HOME | End: 2018-08-23
Attending: PSYCHIATRY & NEUROLOGY | Admitting: PSYCHIATRY & NEUROLOGY
Payer: MEDICAID

## 2018-08-14 VITALS
HEART RATE: 69 BPM | TEMPERATURE: 99 F | DIASTOLIC BLOOD PRESSURE: 82 MMHG | HEIGHT: 68 IN | SYSTOLIC BLOOD PRESSURE: 144 MMHG | WEIGHT: 182.1 LBS

## 2018-08-14 VITALS
DIASTOLIC BLOOD PRESSURE: 77 MMHG | SYSTOLIC BLOOD PRESSURE: 131 MMHG | TEMPERATURE: 98 F | HEART RATE: 64 BPM | RESPIRATION RATE: 17 BRPM | OXYGEN SATURATION: 100 %

## 2018-08-14 DIAGNOSIS — F39 UNSPECIFIED MOOD [AFFECTIVE] DISORDER: ICD-10-CM

## 2018-08-14 DIAGNOSIS — Z98.890 OTHER SPECIFIED POSTPROCEDURAL STATES: Chronic | ICD-10-CM

## 2018-08-14 LAB
BUN SERPL-MCNC: 8 MG/DL — SIGNIFICANT CHANGE UP (ref 7–23)
CALCIUM SERPL-MCNC: 8.7 MG/DL — SIGNIFICANT CHANGE UP (ref 8.4–10.5)
CHLORIDE SERPL-SCNC: 97 MMOL/L — LOW (ref 98–107)
CO2 SERPL-SCNC: 26 MMOL/L — SIGNIFICANT CHANGE UP (ref 22–31)
CREAT SERPL-MCNC: 0.65 MG/DL — SIGNIFICANT CHANGE UP (ref 0.5–1.3)
GLUCOSE SERPL-MCNC: 76 MG/DL — SIGNIFICANT CHANGE UP (ref 70–99)
HCT VFR BLD CALC: 40.6 % — SIGNIFICANT CHANGE UP (ref 39–50)
HGB BLD-MCNC: 13.7 G/DL — SIGNIFICANT CHANGE UP (ref 13–17)
MAGNESIUM SERPL-MCNC: 2 MG/DL — SIGNIFICANT CHANGE UP (ref 1.6–2.6)
MCHC RBC-ENTMCNC: 31.4 PG — SIGNIFICANT CHANGE UP (ref 27–34)
MCHC RBC-ENTMCNC: 33.7 % — SIGNIFICANT CHANGE UP (ref 32–36)
MCV RBC AUTO: 93.1 FL — SIGNIFICANT CHANGE UP (ref 80–100)
NRBC # FLD: 0 — SIGNIFICANT CHANGE UP
PHOSPHATE SERPL-MCNC: 2.8 MG/DL — SIGNIFICANT CHANGE UP (ref 2.5–4.5)
PLATELET # BLD AUTO: 121 K/UL — LOW (ref 150–400)
PMV BLD: 10.5 FL — SIGNIFICANT CHANGE UP (ref 7–13)
POTASSIUM SERPL-MCNC: 3.5 MMOL/L — SIGNIFICANT CHANGE UP (ref 3.5–5.3)
POTASSIUM SERPL-SCNC: 3.5 MMOL/L — SIGNIFICANT CHANGE UP (ref 3.5–5.3)
RBC # BLD: 4.36 M/UL — SIGNIFICANT CHANGE UP (ref 4.2–5.8)
RBC # FLD: 14.4 % — SIGNIFICANT CHANGE UP (ref 10.3–14.5)
SODIUM SERPL-SCNC: 138 MMOL/L — SIGNIFICANT CHANGE UP (ref 135–145)
WBC # BLD: 4.95 K/UL — SIGNIFICANT CHANGE UP (ref 3.8–10.5)
WBC # FLD AUTO: 4.95 K/UL — SIGNIFICANT CHANGE UP (ref 3.8–10.5)

## 2018-08-14 PROCEDURE — 99223 1ST HOSP IP/OBS HIGH 75: CPT

## 2018-08-14 RX ORDER — ESCITALOPRAM OXALATE 10 MG/1
10 TABLET, FILM COATED ORAL ONCE
Qty: 0 | Refills: 0 | Status: COMPLETED | OUTPATIENT
Start: 2018-08-14 | End: 2018-08-14

## 2018-08-14 RX ORDER — THIAMINE MONONITRATE (VIT B1) 100 MG
100 TABLET ORAL DAILY
Qty: 0 | Refills: 0 | Status: COMPLETED | OUTPATIENT
Start: 2018-08-14 | End: 2018-08-17

## 2018-08-14 RX ORDER — FOLIC ACID 0.8 MG
1 TABLET ORAL DAILY
Qty: 0 | Refills: 0 | Status: DISCONTINUED | OUTPATIENT
Start: 2018-08-14 | End: 2018-08-23

## 2018-08-14 RX ORDER — ESCITALOPRAM OXALATE 10 MG/1
10 TABLET, FILM COATED ORAL DAILY
Qty: 0 | Refills: 0 | Status: DISCONTINUED | OUTPATIENT
Start: 2018-08-15 | End: 2018-08-23

## 2018-08-14 RX ORDER — PREGABALIN 225 MG/1
1000 CAPSULE ORAL DAILY
Qty: 0 | Refills: 0 | Status: DISCONTINUED | OUTPATIENT
Start: 2018-08-14 | End: 2018-08-14

## 2018-08-14 RX ADMIN — Medication 50 MILLIGRAM(S): at 23:31

## 2018-08-14 RX ADMIN — ESCITALOPRAM OXALATE 10 MILLIGRAM(S): 10 TABLET, FILM COATED ORAL at 16:51

## 2018-08-14 RX ADMIN — Medication 50 MILLIGRAM(S): at 18:31

## 2018-08-14 NOTE — PROGRESS NOTE BEHAVIORAL HEALTH - NSBHCHARTREVIEWVS_PSY_A_CORE FT
Vital Signs Last 24 Hrs  T(C): 37.4 (14 Aug 2018 13:29), Max: 37.4 (14 Aug 2018 13:29)  T(F): 99.4 (14 Aug 2018 13:29), Max: 99.4 (14 Aug 2018 13:29)  HR: 69 (14 Aug 2018 13:29) (60 - 91)  BP: 144/82 (14 Aug 2018 13:29) (117/63 - 144/82)  BP(mean): --  RR: 17 (14 Aug 2018 11:36) (16 - 18)  SpO2: 100% (14 Aug 2018 11:36) (99% - 100%)

## 2018-08-14 NOTE — PROGRESS NOTE ADULT - PROBLEM SELECTOR PLAN 1
unclear if major depression, bipolar d/o or other Axis 1 d/o  - psych consult reviewed  - will follow up further recs from C/[facility] psych  - 1:1 observation Suspect underlying major depressive disorder. However, cannot exclude alcohol induced mood disorder at this time  - psych consulted. Appreciate recs  - pt medically stable to be transferred to [facility] for further inpatient psych evaluation and management  - continue 1:1 observation

## 2018-08-14 NOTE — ED ADULT NURSE REASSESSMENT NOTE - NS ED NURSE REASSESS COMMENT FT1
Patient received in  low acuity, calm and in good behavioral control. Pt denies active SI at this time. denies any pain or discomfort. 1:1 observation maintained for safety. report given to Khloe VILLALPANDO [facility]. Pt's belongings itemized and transported alongside. yellow receipt for valuables sent to [facility]. Pt left  at 1310pm.

## 2018-08-14 NOTE — PROGRESS NOTE BEHAVIORAL HEALTH - NSBHFUPINTERVALHXFT_PSY_A_CORE
Patient is a 47 year old male with 10 years ETOH abuse. Currently drinking approx 18 cans of beer per day.  Reported longest length of sobriety in the last few months is 2 days "why I had the flu".  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  Mood has been increasingly low with elevated levels of depression.  Patient with suicide attempt prior to [facility] admission via hanging self with belt.  Patient reported hanging self in suicide attempt, waking up after passing out for a few minutes and calling 911.  He has 1 previous attempt at self harm via cutting himself int he abdomen 8 years ago without medical attention needed. No previous psych hx. No previous admissions. N o grisel. No psychosis. No hx of withdrawal symptoms.

## 2018-08-14 NOTE — ED BEHAVIORAL HEALTH NOTE - BEHAVIORAL HEALTH NOTE
Writer was informed patient required transfer to [facility].  Writer obtained bed on [facility].  Writer faxed legals, EKG and transfer sheet to [facility]. Writer was informed patient required transfer to [facility].  Writer obtained bed on [facility].  Writer faxed legals, EKG and transfer sheet to [facility].  Dr. Oliveira states patient can travel with [id number]. [facility] confirmed receiving transfer packet.

## 2018-08-14 NOTE — PROGRESS NOTE BEHAVIORAL HEALTH - SUMMARY
Pt is a 48yo male with 10 year history of Alcohol Abuse, continuous, reports no complications/withdrawals/seizures, recreational Cannabis user (UTOX "+" for THC today) presenting s/p suicide attempt via tying a belt around his neck in the context of alcohol intoxication and secondary to psychosocial stressors (arguing with his family). Patient reports months of preceding gradual worsened mood and insomnia. Patient still reported suicidality and hopelessness when sobriety was achieved. Patient at this time also reporting wish to "detox from alcohol." Unable to go to a detox/rehab at this time due to the suicidality. Patient to be admitted to medicine for alcohol withdrawal management and will be followed by  Psychiatry with much likely subsequent admission for inpatient psychiatric treatment. Pt is a 46yo male with 10 year history of Alcohol Abuse, continuous, reports no complications/withdrawals/seizures, recreational Cannabis user (UTOX "+" for THC today) presenting s/p suicide attempt via tying a belt around his neck in the context of alcohol intoxication and secondary to psychosocial stressors (arguing with his family). Patient reports months of preceding gradual worsened mood and insomnia.     At this time, patient received 1 dose of Librium for withdrawl symptoms (sweating, hot flashes) and has not required further standing medication. PRN ativan is in place without patient requiring to be dosed.      He continues to be constricted and report worsening mood over the past few months. Denies active SI, however remains with significant depression.    Poor sleep.   Poor insight and judgement

## 2018-08-14 NOTE — PROGRESS NOTE BEHAVIORAL HEALTH - NSBHCONSULTOBSREASON_PSY_A_CORE FT
recent suicide attempt, remains depressed, constricted recent suicide attempt, remains depressed, constricted - high risk

## 2018-08-14 NOTE — PROGRESS NOTE ADULT - ASSESSMENT
46 y/o M with ETOH dependence and history of suicide attempt presents to ED after suicide attempt this am

## 2018-08-14 NOTE — PROGRESS NOTE BEHAVIORAL HEALTH - NSBHCONSULTPRIMARYDISCUSSYES_PSY_A_CORE FT
Discussion held with PA and attending in regards to patient requiring further psychiatric stabilization in an inpatient setting. Medical team provided medical clearance.

## 2018-08-14 NOTE — PROGRESS NOTE BEHAVIORAL HEALTH - NSBHCONSFOLLOWNEEDS_PSY_A_CORE
Patient needs further psychiatric safety assessment prior to discharge/refer to [facility] inpatient treatment

## 2018-08-14 NOTE — PROGRESS NOTE BEHAVIORAL HEALTH - NSBHCONSULTRECOMMENDOTHER_PSY_A_CORE FT
Continue care during inaptient psychiatric treatment Continue care during impatient psychiatric treatment

## 2018-08-14 NOTE — PROGRESS NOTE ADULT - PROBLEM SELECTOR PLAN 2
- CIWA protocol with symptom triggered ativan  - thiamine, folic acid, MVI Patient does not appear to be actively withdrawing and has no prior hx of Alcohol withdrawal syndrome or seizures  - CIWA protocol with symptom triggered ativan  - thiamine, folic acid, MVI  - trazodone 100mg PO qhs prn for insomnia

## 2018-08-14 NOTE — PROGRESS NOTE ADULT - SUBJECTIVE AND OBJECTIVE BOX
Patient is a 47y old  Male who presents with a chief complaint of Suicide attempt (13 Aug 2018 21:25)        SUBJECTIVE / OVERNIGHT EVENTS:      MEDICATIONS  (STANDING):  dextrose 5% + sodium chloride 0.45%. 1000 milliLiter(s) (100 mL/Hr) IV Continuous <Continuous>  folic acid 1 milliGRAM(s) Oral daily  multivitamin 1 Tablet(s) Oral daily  thiamine 100 milliGRAM(s) Oral daily    MEDICATIONS  (PRN):  LORazepam     Tablet 2 milliGRAM(s) Oral every 1 hour PRN Symptom-triggered: each CIWA -Ar score 8 or GREATER  traZODone 100 milliGRAM(s) Oral at bedtime PRN insomnia      Vital Signs Last 24 Hrs  T(C): 37 (14 Aug 2018 09:13), Max: 37.1 (13 Aug 2018 10:57)  T(F): 98.6 (14 Aug 2018 09:13), Max: 98.8 (13 Aug 2018 10:57)  HR: 65 (14 Aug 2018 09:13) (60 - 94)  BP: 135/89 (14 Aug 2018 09:13) (117/63 - 165/89)  BP(mean): --  RR: 18 (14 Aug 2018 09:13) (16 - 18)  SpO2: 100% (14 Aug 2018 09:13) (99% - 100%)  CAPILLARY BLOOD GLUCOSE        I&O's Summary        PHYSICAL EXAM  GENERAL: NAD, well-developed  HEAD:  Atraumatic, Normocephalic  EYES: EOMI, PERRLA, conjunctiva and sclera clear  NECK: Supple, No JVD  CHEST/LUNG: Clear to auscultation bilaterally; No wheeze  HEART: Regular rate and rhythm; No murmurs, rubs, or gallops  ABDOMEN: Soft, Nontender, Nondistended; Bowel sounds present  EXTREMITIES:  2+ Peripheral Pulses, No clubbing, cyanosis, or edema  PSYCH: AAOx3  SKIN: No rashes or lesions    LABS:                        13.7   4.95  )-----------( 121      ( 14 Aug 2018 05:30 )             40.6     08-14    138  |  97<L>  |  8   ----------------------------<  76  3.5   |  26  |  0.65    Ca    8.7      14 Aug 2018 05:40  Phos  2.8     08-14  Mg     2.0     08-14    TPro  7.1  /  Alb  4.6  /  TBili  0.5  /  DBili  x   /  AST  118<H>  /  ALT  89<H>  /  AlkPhos  145<H>  08-13              RADIOLOGY & ADDITIONAL TESTS:    Imaging Personally Reviewed:  Consultant(s) Notes Reviewed:    Care Discussed with Consultants/Other Providers: Patient is a 47y old  Male who presents with a chief complaint of Suicide attempt (13 Aug 2018 21:25)    SUBJECTIVE / OVERNIGHT EVENTS:  Patient reports no physical complaints. However, he is not happy by the fact he has to be constantly supervised. He denies current thoughts of suicide and denies having tremors or feeling anxious. He declined further discussion with me after he was told he could not be discharged and would be transferred to [facility] until he was deemed no longer a risk to himself after further psychiatric evaluation. He stated, "This is why people don't come forth for help" and proceed to put his earplugs in.     MEDICATIONS  (STANDING):  dextrose 5% + sodium chloride 0.45%. 1000 milliLiter(s) (100 mL/Hr) IV Continuous <Continuous>  folic acid 1 milliGRAM(s) Oral daily  multivitamin 1 Tablet(s) Oral daily  thiamine 100 milliGRAM(s) Oral daily    MEDICATIONS  (PRN):  LORazepam     Tablet 2 milliGRAM(s) Oral every 1 hour PRN Symptom-triggered: each CIWA -Ar score 8 or GREATER  traZODone 100 milliGRAM(s) Oral at bedtime PRN insomnia      Vital Signs Last 24 Hrs  T(C): 37 (14 Aug 2018 09:13), Max: 37.1 (13 Aug 2018 10:57)  T(F): 98.6 (14 Aug 2018 09:13), Max: 98.8 (13 Aug 2018 10:57)  HR: 65 (14 Aug 2018 09:13) (60 - 94)  BP: 135/89 (14 Aug 2018 09:13) (117/63 - 165/89)  BP(mean): --  RR: 18 (14 Aug 2018 09:13) (16 - 18)  SpO2: 100% (14 Aug 2018 09:13) (99% - 100%)          PHYSICAL EXAM  GENERAL: NAD, well-developed  HEAD:  Atraumatic, Normocephalic  EYES: EOMI, normal conjunctiva and sclera clear  NECK: Supple, No JVD  CHEST/LUNG: Normal respiratory effort  EXTREMITIES: No clubbing, cyanosis, or peripheral edema  PSYCH: AAOx3. Depressed affect.      LABS:                        13.7   4.95  )-----------( 121      ( 14 Aug 2018 05:30 )             40.6     08-14    138  |  97<L>  |  8   ----------------------------<  76  3.5   |  26  |  0.65    Ca    8.7      14 Aug 2018 05:40  Phos  2.8     08-14  Mg     2.0     08-14    TPro  7.1  /  Alb  4.6  /  TBili  0.5  /  DBili  x   /  AST  118<H>  /  ALT  89<H>  /  AlkPhos  145<H>  08-13          Consultant(s) Notes Reviewed:  Yes  Care Discussed with Consultants/Other Providers: Yes

## 2018-08-14 NOTE — PROGRESS NOTE BEHAVIORAL HEALTH - NSBHFUPSUICINTERVALFT_PSY_A_CORE
Recent attempt 8/13 via hanging self with belt. Denies active plan or intent at this time, however remains with significant depression and minimzation of symptoms.

## 2018-08-14 NOTE — ED ADULT NURSE REASSESSMENT NOTE - NS ED NURSE REASSESS COMMENT FT1
Pt endorsed to  nurse.  IV access d/c.  Pt escorted to , [facility], via wheelchair.  Continues on 1:1 for safety.

## 2018-08-14 NOTE — PROGRESS NOTE BEHAVIORAL HEALTH - NSBHCHARTREVIEWLAB_PSY_A_CORE FT
13.7   4.95  )-----------( 121      ( 14 Aug 2018 05:30 )             40.6   08-14    138  |  97<L>  |  8   ----------------------------<  76  3.5   |  26  |  0.65    Ca    8.7      14 Aug 2018 05:40  Phos  2.8     08-14  Mg     2.0     08-14    TPro  7.1  /  Alb  4.6  /  TBili  0.5  /  DBili  x   /  AST  118<H>  /  ALT  89<H>  /  AlkPhos  145<H>  08-13

## 2018-08-14 NOTE — PROGRESS NOTE BEHAVIORAL HEALTH - OTHER
patient resting in bed, reporting gait WNL on the softer side but audible unable to tolerate an MMSE at this time

## 2018-08-15 ENCOUNTER — TRANSCRIPTION ENCOUNTER (OUTPATIENT)
Age: 48
End: 2018-08-15

## 2018-08-15 DIAGNOSIS — F10.10 ALCOHOL ABUSE, UNCOMPLICATED: ICD-10-CM

## 2018-08-15 PROCEDURE — 99232 SBSQ HOSP IP/OBS MODERATE 35: CPT

## 2018-08-15 PROCEDURE — 99223 1ST HOSP IP/OBS HIGH 75: CPT

## 2018-08-15 RX ORDER — PREGABALIN 225 MG/1
1 CAPSULE ORAL
Qty: 10 | Refills: 0 | OUTPATIENT
Start: 2018-08-15

## 2018-08-15 RX ORDER — THIAMINE MONONITRATE (VIT B1) 100 MG
1 TABLET ORAL
Qty: 10 | Refills: 0 | OUTPATIENT
Start: 2018-08-15

## 2018-08-15 RX ORDER — TRAZODONE HCL 50 MG
50 TABLET ORAL AT BEDTIME
Qty: 0 | Refills: 0 | Status: DISCONTINUED | OUTPATIENT
Start: 2018-08-15 | End: 2018-08-23

## 2018-08-15 RX ORDER — FOLIC ACID 0.8 MG
1 TABLET ORAL
Qty: 10 | Refills: 0 | OUTPATIENT
Start: 2018-08-15

## 2018-08-15 RX ORDER — TRAZODONE HCL 50 MG
1 TABLET ORAL
Qty: 10 | Refills: 0 | OUTPATIENT
Start: 2018-08-15

## 2018-08-15 RX ADMIN — Medication 1 TABLET(S): at 08:57

## 2018-08-15 RX ADMIN — Medication 100 MILLIGRAM(S): at 08:57

## 2018-08-15 RX ADMIN — Medication 50 MILLIGRAM(S): at 21:57

## 2018-08-15 RX ADMIN — Medication 1 MILLIGRAM(S): at 08:57

## 2018-08-15 RX ADMIN — Medication 50 MILLIGRAM(S): at 06:47

## 2018-08-15 RX ADMIN — ESCITALOPRAM OXALATE 10 MILLIGRAM(S): 10 TABLET, FILM COATED ORAL at 08:57

## 2018-08-15 RX ADMIN — Medication 50 MILLIGRAM(S): at 13:38

## 2018-08-15 NOTE — DISCHARGE NOTE ADULT - PATIENT PORTAL LINK FT
You can access the [facility] Patient Portal, offered by [facility], by registering with the following website: [email or web address]

## 2018-08-15 NOTE — DISCHARGE NOTE ADULT - SECONDARY DIAGNOSIS.
Alcohol abuse Current severe episode of major depressive disorder without psychotic features, unspecified whether recurrent

## 2018-08-15 NOTE — CONSULT NOTE ADULT - SUBJECTIVE AND OBJECTIVE BOX
CC: ETOH abuse    HPI: hospital course - Patient is a 48 y/o M with ETOH dependence and history of prior suicide attempt admitted s/p recent suicide attempt by hanging with a belt and alcohol intoxication. He was monitored in the ED holding area. There was initial concern for possibility of alcohol withdrawal on presentation. However, patient had no signs or symptoms suggestive of or consistent with active alcohol withdrawal syndrome. He did exhibit symptoms of severe major depressive disorder and was deemed a threat to himself and thus requiring inpatient psychiatric admission. Exam and labs were unremarkable for any significant pathology, but he was found to be positive for cannabinoids on urine toxicology screen and elevated alcohol level on presentation. He was thus deemed medically stable to be transferred to [facility] for further psychiatric management. no events or complaints at [facility]    Allergies: Latex, PCN    PMHX: per HPI  FHX: NC  Social Hx: + ETOH    ROS:  No HA/DZ  No Vision changes   No CP, SOB  No N/V/D  No Edema  No Rash  NO weakness, numbness    MEDICATIONS  (STANDING):  chlordiazePOXIDE   Oral   chlordiazePOXIDE 50 milliGRAM(s) Oral every 8 hours  escitalopram 10 milliGRAM(s) Oral daily  folic acid 1 milliGRAM(s) Oral daily  multivitamin 1 Tablet(s) Oral daily  thiamine 100 milliGRAM(s) Oral daily    MEDICATIONS  (PRN):  chlordiazePOXIDE 50 milliGRAM(s) Oral every 1 hour PRN Symptom-triggered: each CIWA -Ar score 8 or GREATER      T(C): 36.9 (08-14-18 @ 23:32)  HR: 73 (08-14-18 @ 23:32)  BP: 130/70 (08-14-18 @ 23:32)  RR: --12  SpO2: --  CAPILLARY BLOOD GLUCOSE        I&O's Summary      PHYSICAL EXAM:  GENERAL: NAD, well-developed, AOx3  HEAD:  Atraumatic, Normocephalic  EYES: EOMI, PERRL, conjunctiva and sclera clear  NECK: Supple, No JVD  CHEST/LUNG: Clear to auscultation bilaterally  HEART: Regular rate and rhythm; No murmurs, rubs, or gallops, No Edema  ABDOMEN: Soft, Nontender, Nondistended; Bowel sounds present  EXTREMITIES:  2+ Peripheral Pulses, No clubbing, cyanosis  PSYCH: No SI/HI  NEUROLOGY: non-focal  SKIN: No rashes or lesions    LABS:                        13.7   4.95  )-----------( 121      ( 14 Aug 2018 05:30 )             40.6     08-14    138  |  97<L>  |  8   ----------------------------<  76  3.5   |  26  |  0.65    Ca    8.7      14 Aug 2018 05:40  Phos  2.8     08-14  Mg     2.0     08-14                    RADIOLOGY & ADDITIONAL TESTS: EKG personally reviewed NSR    Imaging Personally Reviewed: CT-A neck, no dissection     Consultant(s) Notes Reviewed:      Care Discussed with Consultants/Other Providers: Toni Nation

## 2018-08-15 NOTE — DISCHARGE NOTE ADULT - HOSPITAL COURSE
Patient is a 48 y/o M with ETOH dependence and history of prior suicide attempt admitted s/p recent suicide attempt by hanging with a belt and alcohol intoxication. He was monitored in the ED holding area. There was initial concern for possibility of alcohol withdrawal on presentation. However, patient had no signs or symptoms suggestive of or consistent with active alcohol withdrawal syndrome. He did exhibit symptoms of severe major depressive disorder and was deemed a threat to himself and thus requiring inpatient psychiatric admission. Exam and labs were unremarkable for any significant pathology, but he was found to be positive for cannabinoids on urine toxicology screen and elevated alcohol level on presentation. He was thus deemed medically stable to be transferred to [facility] for further psychiatric management.

## 2018-08-15 NOTE — DISCHARGE NOTE ADULT - CARE PLAN
Principal Discharge DX:	Suicide attempt  Goal:	Patient safety from self-inflicted harm.  Assessment and plan of treatment:	Patient is medically stable to be transferred to [facility] for further inpatient psych evaluation and management for underlying major depressive disorder.  Secondary Diagnosis:	Alcohol abuse  Assessment and plan of treatment:	Patient not actively withdrawing and has no prior hx of Alcohol withdrawal syndrome or seizures. Can pursue CIWA protocol with symptom triggered oral Ativan if patient starts exhibiting signs of withdrawal. Continue daily thiamine, folic acid, MVI. Can consider trazodone 100mg orally at night as needed for insomnia.  Secondary Diagnosis:	Current severe episode of major depressive disorder without psychotic features, unspecified whether recurrent  Assessment and plan of treatment:	Patient to be further evaluated by inpatient psychiatric team.

## 2018-08-15 NOTE — DISCHARGE NOTE ADULT - PLAN OF CARE
Patient safety from self-inflicted harm. Patient is medically stable to be transferred to [facility] for further inpatient psych evaluation and management for underlying major depressive disorder. Patient not actively withdrawing and has no prior hx of Alcohol withdrawal syndrome or seizures. Can pursue CIWA protocol with symptom triggered oral Ativan if patient starts exhibiting signs of withdrawal. Continue daily thiamine, folic acid, MVI. Can consider trazodone 100mg orally at night as needed for insomnia. Patient to be further evaluated by inpatient psychiatric team.

## 2018-08-15 NOTE — CONSULT NOTE ADULT - PROBLEM SELECTOR RECOMMENDATION 2
to me he does not appear to be in w/d  will defer CIWA taper to psych, dw Dr Nation would prefer Ativan in setting of transaminitis  transaminitis likely due to mild alcoholic hepatitis and was improving, will repeat in AM  cw Thiamine, FA, MVI

## 2018-08-15 NOTE — DISCHARGE NOTE ADULT - MEDICATION SUMMARY - MEDICATIONS TO TAKE
I will START or STAY ON the medications listed below when I get home from the hospital:    LORazepam 2 mg oral tablet  -- 1 tab(s) by mouth every hour, As needed, Symptom-triggered: each CIWA -Ar score 8 or GREATER MDD:8mg  -- Indication: For Alcohol abuse    traZODone 100 mg oral tablet  -- 1 tab(s) by mouth once a day (at bedtime), As needed, insomnia  -- Indication: For Insomnia    Multiple Vitamins oral tablet  -- 1 tab(s) by mouth once a day  -- Indication: For Nutrition    B-12 1000 mcg oral tablet  -- 1 tab(s) by mouth once a day  -- Indication: For Nutrition    folic acid 1 mg oral tablet  -- 1 tab(s) by mouth once a day  -- Indication: For Nutrition    thiamine 100 mg oral tablet  -- 1 tab(s) by mouth once a day  -- Indication: For History of alcohol abuse

## 2018-08-16 LAB
ALBUMIN SERPL ELPH-MCNC: 4.2 G/DL — SIGNIFICANT CHANGE UP (ref 3.3–5)
ALP SERPL-CCNC: 116 U/L — SIGNIFICANT CHANGE UP (ref 40–120)
ALT FLD-CCNC: 113 U/L — HIGH (ref 4–41)
AST SERPL-CCNC: 193 U/L — HIGH (ref 4–40)
BILIRUB SERPL-MCNC: 0.9 MG/DL — SIGNIFICANT CHANGE UP (ref 0.2–1.2)
BUN SERPL-MCNC: 10 MG/DL — SIGNIFICANT CHANGE UP (ref 7–23)
CALCIUM SERPL-MCNC: 9 MG/DL — SIGNIFICANT CHANGE UP (ref 8.4–10.5)
CHLORIDE SERPL-SCNC: 100 MMOL/L — SIGNIFICANT CHANGE UP (ref 98–107)
CO2 SERPL-SCNC: 22 MMOL/L — SIGNIFICANT CHANGE UP (ref 22–31)
CREAT SERPL-MCNC: 0.65 MG/DL — SIGNIFICANT CHANGE UP (ref 0.5–1.3)
GLUCOSE SERPL-MCNC: 69 MG/DL — LOW (ref 70–99)
POTASSIUM SERPL-MCNC: 3.6 MMOL/L — SIGNIFICANT CHANGE UP (ref 3.5–5.3)
POTASSIUM SERPL-SCNC: 3.6 MMOL/L — SIGNIFICANT CHANGE UP (ref 3.5–5.3)
PROT SERPL-MCNC: 6.3 G/DL — SIGNIFICANT CHANGE UP (ref 6–8.3)
SODIUM SERPL-SCNC: 142 MMOL/L — SIGNIFICANT CHANGE UP (ref 135–145)

## 2018-08-16 PROCEDURE — 99233 SBSQ HOSP IP/OBS HIGH 50: CPT

## 2018-08-16 PROCEDURE — 99232 SBSQ HOSP IP/OBS MODERATE 35: CPT

## 2018-08-16 RX ORDER — DIPHENHYDRAMINE HCL 50 MG
50 CAPSULE ORAL EVERY 6 HOURS
Qty: 0 | Refills: 0 | Status: DISCONTINUED | OUTPATIENT
Start: 2018-08-16 | End: 2018-08-23

## 2018-08-16 RX ADMIN — Medication 50 MILLIGRAM(S): at 09:37

## 2018-08-16 RX ADMIN — Medication 1 APPLICATION(S): at 21:59

## 2018-08-16 RX ADMIN — Medication 50 MILLIGRAM(S): at 16:27

## 2018-08-16 RX ADMIN — Medication 50 MILLIGRAM(S): at 06:18

## 2018-08-16 RX ADMIN — Medication 1 MILLIGRAM(S): at 19:58

## 2018-08-17 PROCEDURE — 99232 SBSQ HOSP IP/OBS MODERATE 35: CPT

## 2018-08-17 PROCEDURE — 99233 SBSQ HOSP IP/OBS HIGH 50: CPT

## 2018-08-17 NOTE — CHART NOTE - NSCHARTNOTEFT_GEN_A_CORE
Called by nurse to evaluate 47 year old male expressing SI with pt stating “took 70 ft head start and ran into door head first to try to fracture his skull. Incident was witnessed by staff. Denies any loss of consciousness after incident. Denies headache, dizziness, visual changes, nausea or vomiting.  Pt rates the pain 4/10 and is slight tender to palpation with noted erythema and slight swelling on top of head. Pt EOMI, pupillary reflex to light intact,  strength 5/5, no change in gait, Cranial nerves 2-12 grossly intact. Order for Tylenol 650 mg prn placed. No further management needed.

## 2018-08-17 NOTE — PROGRESS NOTE ADULT - SUBJECTIVE AND OBJECTIVE BOX
Patient is a 47y old  Male who presents with a chief complaint of ETOH abuse    SUBJECTIVE / OVERNIGHT EVENTS: overnight event noted - denies any complaints this morning on 1:1    ROS:  No HA/DZ  No Vision changes   No CP, SOB  No N/V/D  No Edema  No Rash  NO weakness, numbness    MEDICATIONS  (STANDING):  chlordiazePOXIDE   Oral   chlordiazePOXIDE 50 milliGRAM(s) Oral once  clobetasol 0.05% Ointment 1 Application(s) Topical two times a day  escitalopram 10 milliGRAM(s) Oral daily  folic acid 1 milliGRAM(s) Oral daily  multivitamin 1 Tablet(s) Oral daily  thiamine 100 milliGRAM(s) Oral daily    MEDICATIONS  (PRN):  chlordiazePOXIDE 50 milliGRAM(s) Oral every 1 hour PRN Symptom-triggered: each CIWA -Ar score 8 or GREATER  diphenhydrAMINE   Capsule 50 milliGRAM(s) Oral every 6 hours PRN allergic reaction  traZODone 50 milliGRAM(s) Oral at bedtime PRN insomnia      T(C): 36.8 (08-16-18 @ 14:52)  HR: 79 (08-16-18 @ 14:52)  BP: 134/91 (08-16-18 @ 14:52)  RR: --12  SpO2: --  CAPILLARY BLOOD GLUCOSE        I&O's Summary      PHYSICAL EXAM:  GENERAL: NAD, well-developed  HEAD:  Atraumatic, Normocephalic  EYES: EOMI, PERRL, conjunctiva and sclera clear  NECK: Supple, No JVD  CHEST/LUNG: Clear to auscultation bilaterally  HEART: Regular rate and rhythm; No murmurs, rubs, or gallops, No Edema  ABDOMEN: Soft, Nontender, Nondistended; Bowel sounds present  EXTREMITIES:  2+ Peripheral Pulses, No clubbing, cyanosis  PSYCH: No SI/HI  NEUROLOGY: non-focal  SKIN: improved rash, blister resolved    LABS:    08-16    142  |  100  |  10  ----------------------------<  69<L>  3.6   |  22  |  0.65    Ca    9.0      16 Aug 2018 09:15    TPro  6.3  /  Alb  4.2  /  TBili  0.9  /  DBili  x   /  AST  193<H>  /  ALT  113<H>  /  AlkPhos  116  08-16                  RADIOLOGY & ADDITIONAL TESTS:    Imaging Personally Reviewed:    Consultant(s) Notes Reviewed:      Care Discussed with Consultants/Other Providers: Derm Resident - Dr Jarrell Patient is a 47y old  Male who presents with a chief complaint of ETOH abuse    SUBJECTIVE / OVERNIGHT EVENTS: overnight event noted - denies any complaints this morning on 1:1 - refusing labs and imaging     ROS:  No HA/DZ  No Vision changes   No CP, SOB  No N/V/D  No Edema  No Rash  NO weakness, numbness    MEDICATIONS  (STANDING):  chlordiazePOXIDE   Oral   chlordiazePOXIDE 50 milliGRAM(s) Oral once  clobetasol 0.05% Ointment 1 Application(s) Topical two times a day  escitalopram 10 milliGRAM(s) Oral daily  folic acid 1 milliGRAM(s) Oral daily  multivitamin 1 Tablet(s) Oral daily  thiamine 100 milliGRAM(s) Oral daily    MEDICATIONS  (PRN):  chlordiazePOXIDE 50 milliGRAM(s) Oral every 1 hour PRN Symptom-triggered: each CIWA -Ar score 8 or GREATER  diphenhydrAMINE   Capsule 50 milliGRAM(s) Oral every 6 hours PRN allergic reaction  traZODone 50 milliGRAM(s) Oral at bedtime PRN insomnia      T(C): 36.8 (08-16-18 @ 14:52)  HR: 79 (08-16-18 @ 14:52)  BP: 134/91 (08-16-18 @ 14:52)  RR: --12  SpO2: --  CAPILLARY BLOOD GLUCOSE        I&O's Summary      PHYSICAL EXAM:  GENERAL: NAD, well-developed  HEAD:  Atraumatic, Normocephalic  EYES: EOMI, PERRL, conjunctiva and sclera clear  NECK: Supple, No JVD  CHEST/LUNG: Clear to auscultation bilaterally  HEART: Regular rate and rhythm; No murmurs, rubs, or gallops, No Edema  ABDOMEN: Soft, Nontender, Nondistended; Bowel sounds present  EXTREMITIES:  2+ Peripheral Pulses, No clubbing, cyanosis  PSYCH: No SI/HI  NEUROLOGY: non-focal  SKIN: improved rash, blister resolved    LABS:    08-16    142  |  100  |  10  ----------------------------<  69<L>  3.6   |  22  |  0.65    Ca    9.0      16 Aug 2018 09:15    TPro  6.3  /  Alb  4.2  /  TBili  0.9  /  DBili  x   /  AST  193<H>  /  ALT  113<H>  /  AlkPhos  116  08-16                  RADIOLOGY & ADDITIONAL TESTS:    Imaging Personally Reviewed:    Consultant(s) Notes Reviewed:      Care Discussed with Consultants/Other Providers: [place] Resident - Dr Jarrell

## 2018-08-18 PROCEDURE — 99232 SBSQ HOSP IP/OBS MODERATE 35: CPT

## 2018-08-18 RX ADMIN — Medication 1 TABLET(S): at 14:17

## 2018-08-18 RX ADMIN — Medication 1 APPLICATION(S): at 20:52

## 2018-08-18 RX ADMIN — Medication 1 APPLICATION(S): at 12:56

## 2018-08-18 RX ADMIN — Medication 1 MILLIGRAM(S): at 14:17

## 2018-08-18 RX ADMIN — Medication 50 MILLIGRAM(S): at 21:30

## 2018-08-19 PROCEDURE — 99232 SBSQ HOSP IP/OBS MODERATE 35: CPT

## 2018-08-19 RX ADMIN — Medication 1 APPLICATION(S): at 09:28

## 2018-08-19 RX ADMIN — Medication 1 MILLIGRAM(S): at 09:28

## 2018-08-19 RX ADMIN — Medication 1 APPLICATION(S): at 21:28

## 2018-08-19 RX ADMIN — Medication 50 MILLIGRAM(S): at 09:28

## 2018-08-19 RX ADMIN — Medication 50 MILLIGRAM(S): at 20:20

## 2018-08-19 RX ADMIN — Medication 1 TABLET(S): at 09:28

## 2018-08-19 RX ADMIN — ESCITALOPRAM OXALATE 10 MILLIGRAM(S): 10 TABLET, FILM COATED ORAL at 11:44

## 2018-08-20 PROCEDURE — 99232 SBSQ HOSP IP/OBS MODERATE 35: CPT

## 2018-08-20 RX ADMIN — Medication 50 MILLIGRAM(S): at 16:35

## 2018-08-20 RX ADMIN — Medication 1 TABLET(S): at 08:51

## 2018-08-20 RX ADMIN — Medication 1 MILLIGRAM(S): at 08:51

## 2018-08-20 RX ADMIN — Medication 1 APPLICATION(S): at 11:00

## 2018-08-20 RX ADMIN — Medication 1 APPLICATION(S): at 20:41

## 2018-08-20 RX ADMIN — ESCITALOPRAM OXALATE 10 MILLIGRAM(S): 10 TABLET, FILM COATED ORAL at 08:51

## 2018-08-21 PROCEDURE — 90853 GROUP PSYCHOTHERAPY: CPT

## 2018-08-21 PROCEDURE — 99232 SBSQ HOSP IP/OBS MODERATE 35: CPT

## 2018-08-21 PROCEDURE — 90832 PSYTX W PT 30 MINUTES: CPT | Mod: 59

## 2018-08-21 RX ADMIN — Medication 1 APPLICATION(S): at 21:45

## 2018-08-21 RX ADMIN — Medication 1 TABLET(S): at 08:17

## 2018-08-21 RX ADMIN — Medication 1 APPLICATION(S): at 08:17

## 2018-08-21 RX ADMIN — ESCITALOPRAM OXALATE 10 MILLIGRAM(S): 10 TABLET, FILM COATED ORAL at 08:17

## 2018-08-21 RX ADMIN — Medication 1 MILLIGRAM(S): at 08:17

## 2018-08-21 NOTE — PROGRESS NOTE ADULT - SUBJECTIVE AND OBJECTIVE BOX
Patient is a 47y old  Male who presents with a chief complaint of ETOH abuse    SUBJECTIVE / OVERNIGHT EVENTS: Seen in the day room. Reports improvement in rash on his palms. No further pruritis. Also denies any abdominal pain, nausea or vomiting. No issues with oral intake. Patient is agreeable to blood draw tomorrow to monitor LFTs.     ROS:  No HA/DZ  No Vision changes   No CP, SOB  No N/V/D  No Edema  No new rash  NO weakness, numbness    MEDICATIONS  (STANDING):  chlordiazePOXIDE   Oral   chlordiazePOXIDE 50 milliGRAM(s) Oral once  clobetasol 0.05% Ointment 1 Application(s) Topical two times a day  escitalopram 10 milliGRAM(s) Oral daily  folic acid 1 milliGRAM(s) Oral daily  multivitamin 1 Tablet(s) Oral daily  thiamine 100 milliGRAM(s) Oral daily    MEDICATIONS  (PRN):  chlordiazePOXIDE 50 milliGRAM(s) Oral every 1 hour PRN Symptom-triggered: each CIWA -Ar score 8 or GREATER  diphenhydrAMINE   Capsule 50 milliGRAM(s) Oral every 6 hours PRN allergic reaction  traZODone 50 milliGRAM(s) Oral at bedtime PRN insomnia    Vital Signs Last 24 Hrs  T(C): 36.5 (21 Aug 2018 08:13), Max: 36.5 (21 Aug 2018 08:13)  T(F): 97.7 (21 Aug 2018 08:13), Max: 97.7 (21 Aug 2018 08:13)  HR: --60  BP: --103/67  BP(mean): --  RR: --  SpO2: --      PHYSICAL EXAM:  GENERAL: NAD, well-developed  HEAD:  Atraumatic, Normocephalic  EYES: EOMI, PERRL, conjunctiva and sclera clear  NECK: Supple, No JVD  CHEST/LUNG: Clear to auscultation bilaterally  HEART: Regular rate and rhythm; No murmurs, rubs, or gallops, No Edema  ABDOMEN: Soft, Nontender, Nondistended; Bowel sounds present  EXTREMITIES:  2+ Peripheral Pulses, No clubbing, cyanosis  PSYCH: No SI/HI  NEUROLOGY: non-focal  SKIN: improved rash, blister resolved    LABS:    08-16    142  |  100  |  10  ----------------------------<  69<L>  3.6   |  22  |  0.65    Ca    9.0      16 Aug 2018 09:15    TPro  6.3  /  Alb  4.2  /  TBili  0.9  /  DBili  x   /  AST  193<H>  /  ALT  113<H>  /  AlkPhos  116  08-16                  RADIOLOGY & ADDITIONAL TESTS:    Imaging Personally Reviewed:    Consultant(s) Notes Reviewed:      Care Discussed with Consultants/Other Providers: Derm Resident - Dr Jarrell

## 2018-08-22 LAB
ALBUMIN SERPL ELPH-MCNC: 4.4 G/DL — SIGNIFICANT CHANGE UP (ref 3.3–5)
ALP SERPL-CCNC: 110 U/L — SIGNIFICANT CHANGE UP (ref 40–120)
ALT FLD-CCNC: 195 U/L — HIGH (ref 4–41)
AST SERPL-CCNC: 200 U/L — HIGH (ref 4–40)
BILIRUB SERPL-MCNC: 0.6 MG/DL — SIGNIFICANT CHANGE UP (ref 0.2–1.2)
BUN SERPL-MCNC: 9 MG/DL — SIGNIFICANT CHANGE UP (ref 7–23)
CALCIUM SERPL-MCNC: 9.7 MG/DL — SIGNIFICANT CHANGE UP (ref 8.4–10.5)
CHLORIDE SERPL-SCNC: 105 MMOL/L — SIGNIFICANT CHANGE UP (ref 98–107)
CHOLEST SERPL-MCNC: 215 MG/DL — HIGH (ref 120–199)
CO2 SERPL-SCNC: 24 MMOL/L — SIGNIFICANT CHANGE UP (ref 22–31)
CREAT SERPL-MCNC: 0.64 MG/DL — SIGNIFICANT CHANGE UP (ref 0.5–1.3)
GLUCOSE SERPL-MCNC: 91 MG/DL — SIGNIFICANT CHANGE UP (ref 70–99)
HDLC SERPL-MCNC: 38 MG/DL — SIGNIFICANT CHANGE UP (ref 35–55)
HIV 1+2 AB+HIV1 P24 AG SERPL QL IA: SIGNIFICANT CHANGE UP
LIPID PNL WITH DIRECT LDL SERPL: 146 MG/DL — SIGNIFICANT CHANGE UP
POTASSIUM SERPL-MCNC: 4 MMOL/L — SIGNIFICANT CHANGE UP (ref 3.5–5.3)
POTASSIUM SERPL-SCNC: 4 MMOL/L — SIGNIFICANT CHANGE UP (ref 3.5–5.3)
PROT SERPL-MCNC: 7.1 G/DL — SIGNIFICANT CHANGE UP (ref 6–8.3)
SODIUM SERPL-SCNC: 144 MMOL/L — SIGNIFICANT CHANGE UP (ref 135–145)
TRIGL SERPL-MCNC: 138 MG/DL — SIGNIFICANT CHANGE UP (ref 10–149)

## 2018-08-22 PROCEDURE — 90853 GROUP PSYCHOTHERAPY: CPT

## 2018-08-22 PROCEDURE — 99233 SBSQ HOSP IP/OBS HIGH 50: CPT

## 2018-08-22 PROCEDURE — 99232 SBSQ HOSP IP/OBS MODERATE 35: CPT | Mod: 25

## 2018-08-22 RX ADMIN — ESCITALOPRAM OXALATE 10 MILLIGRAM(S): 10 TABLET, FILM COATED ORAL at 09:08

## 2018-08-22 RX ADMIN — Medication 1 MILLIGRAM(S): at 09:08

## 2018-08-22 RX ADMIN — Medication 1 APPLICATION(S): at 09:08

## 2018-08-22 RX ADMIN — Medication 1 TABLET(S): at 09:08

## 2018-08-22 NOTE — PROGRESS NOTE ADULT - SUBJECTIVE AND OBJECTIVE BOX
CC/Reason for Consult: Transaminitis     SUBJECTIVE / OVERNIGHT EVENTS: denies any nausea vomiting, abdominal pain. Agreeable to US of abdomen.     MEDICATIONS  (STANDING):  clobetasol 0.05% Ointment 1 Application(s) Topical two times a day  escitalopram 10 milliGRAM(s) Oral daily  folic acid 1 milliGRAM(s) Oral daily  multivitamin 1 Tablet(s) Oral daily    MEDICATIONS  (PRN):  diphenhydrAMINE   Capsule 50 milliGRAM(s) Oral every 6 hours PRN allergic reaction  traZODone 50 milliGRAM(s) Oral at bedtime PRN insomnia      Vital Signs Last 24 Hrs  T(C): 36.4 (22 Aug 2018 07:35), Max: 36.4 (22 Aug 2018 07:35)  T(F): 97.6 (22 Aug 2018 07:35), Max: 97.6 (22 Aug 2018 07:35)  HR: --75  BP: --123/77  BP(mean): --  RR: --  SpO2: --    PHYSICAL EXAM:  GENERAL: NAD, well-developed  HEAD:  Atraumatic, Normocephalic  EYES: EOMI, PERRLA, conjunctiva and sclera clear  NECK: Supple, No JVD  CHEST/LUNG: Clear to auscultation bilaterally; No wheeze  HEART: Regular rate and rhythm; No murmurs, rubs, or gallops  ABDOMEN: Soft, Nontender, Nondistended; Bowel sounds present  EXTREMITIES:  2+ Peripheral Pulses, No clubbing, cyanosis, or edema  PSYCH: AAOx3  NEUROLOGY: non-focal  SKIN: No rashes or lesions    LABS:    08-22    144  |  105  |  9   ----------------------------<  91  4.0   |  24  |  0.64    Ca    9.7      22 Aug 2018 09:05    TPro  7.1  /  Alb  4.4  /  TBili  0.6  /  DBili  x   /  AST  200<H>  /  ALT  195<H>  /  AlkPhos  110  08-22        RADIOLOGY & ADDITIONAL TESTS:    Imaging Personally Reviewed:    Consultant(s) Notes Reviewed:      Care Discussed with Consultants/Other Providers:

## 2018-08-23 ENCOUNTER — OUTPATIENT (OUTPATIENT)
Dept: OUTPATIENT SERVICES | Facility: HOSPITAL | Age: 48
LOS: 1 days | Discharge: ANOTHER HOSPITAL | End: 2018-08-23

## 2018-08-23 DIAGNOSIS — Z98.890 OTHER SPECIFIED POSTPROCEDURAL STATES: Chronic | ICD-10-CM

## 2018-08-23 LAB
HAV IGM SER-ACNC: NONREACTIVE — SIGNIFICANT CHANGE UP
HBV CORE IGM SER-ACNC: NONREACTIVE — SIGNIFICANT CHANGE UP
HBV SURFACE AB SER-ACNC: NONREACTIVE — SIGNIFICANT CHANGE UP
HBV SURFACE AG SER-ACNC: NONREACTIVE — SIGNIFICANT CHANGE UP
HCV AB S/CO SERPL IA: 0.13 S/CO — SIGNIFICANT CHANGE UP
HCV AB SERPL-IMP: SIGNIFICANT CHANGE UP

## 2018-08-23 PROCEDURE — 99232 SBSQ HOSP IP/OBS MODERATE 35: CPT

## 2018-08-23 PROCEDURE — 76705 ECHO EXAM OF ABDOMEN: CPT | Mod: 26

## 2018-08-23 RX ORDER — ATORVASTATIN CALCIUM 80 MG/1
1 TABLET, FILM COATED ORAL
Qty: 14 | Refills: 0
Start: 2018-08-23 | End: 2018-09-05

## 2018-08-23 RX ORDER — ESCITALOPRAM OXALATE 10 MG/1
1 TABLET, FILM COATED ORAL
Qty: 14 | Refills: 0
Start: 2018-08-23 | End: 2018-09-05

## 2018-08-23 RX ORDER — ATORVASTATIN CALCIUM 80 MG/1
40 TABLET, FILM COATED ORAL AT BEDTIME
Qty: 0 | Refills: 0 | Status: DISCONTINUED | OUTPATIENT
Start: 2018-08-23 | End: 2018-08-23

## 2018-08-23 RX ADMIN — ESCITALOPRAM OXALATE 10 MILLIGRAM(S): 10 TABLET, FILM COATED ORAL at 08:13

## 2018-08-23 RX ADMIN — ATORVASTATIN CALCIUM 40 MILLIGRAM(S): 80 TABLET, FILM COATED ORAL at 21:40

## 2018-08-23 RX ADMIN — Medication 1 MILLIGRAM(S): at 08:13

## 2018-08-23 RX ADMIN — Medication 1 TABLET(S): at 08:13

## 2018-08-23 NOTE — PROGRESS NOTE ADULT - SUBJECTIVE AND OBJECTIVE BOX
CC/Reason for Consult: Transaminitis     SUBJECTIVE / OVERNIGHT EVENTS: NO events. Feels well. NO abdominal pain, nausea or vomiting.     MEDICATIONS  (STANDING):  atorvastatin 40 milliGRAM(s) Oral at bedtime  clobetasol 0.05% Ointment 1 Application(s) Topical two times a day  escitalopram 10 milliGRAM(s) Oral daily  folic acid 1 milliGRAM(s) Oral daily  multivitamin 1 Tablet(s) Oral daily    MEDICATIONS  (PRN):  diphenhydrAMINE   Capsule 50 milliGRAM(s) Oral every 6 hours PRN allergic reaction  traZODone 50 milliGRAM(s) Oral at bedtime PRN insomnia      Vital Signs Last 24 Hrs  T(C): 36.4 (23 Aug 2018 08:38), Max: 36.4 (23 Aug 2018 08:38)  T(F): 97.6 (23 Aug 2018 08:38), Max: 97.6 (23 Aug 2018 08:38)  HR: --70  BP: --140/75  BP(mean): --  RR: --  SpO2: --    PHYSICAL EXAM:  GENERAL: NAD, well-developed  HEAD:  Atraumatic, Normocephalic  EYES: EOMI, PERRLA, conjunctiva and sclera clear  NECK: Supple, No JVD  CHEST/LUNG: Clear to auscultation bilaterally; No wheeze  HEART: Regular rate and rhythm; No murmurs, rubs, or gallops  ABDOMEN: Soft, Nontender, Nondistended; Bowel sounds present  EXTREMITIES:  2+ Peripheral Pulses, No clubbing, cyanosis, or edema  PSYCH: AAOx3  NEUROLOGY: non-focal  SKIN: No rashes or lesions    LABS:    08-22    144  |  105  |  9   ----------------------------<  91  4.0   |  24  |  0.64    Ca    9.7      22 Aug 2018 09:05    TPro  7.1  /  Alb  4.4  /  TBili  0.6  /  DBili  x   /  AST  200<H>  /  ALT  195<H>  /  AlkPhos  110  08-22      < from: US Abdomen Limited (08.23.18 @ 10:31) >  Increased liver echogenicity, may be seen in hepatic steatosis.    < end of copied text >    RADIOLOGY & ADDITIONAL TESTS:    Imaging Personally Reviewed:    Consultant(s) Notes Reviewed:      Care Discussed with Consultants/Other Providers:

## 2018-08-23 NOTE — PROGRESS NOTE ADULT - ASSESSMENT
48 y/o M with ETOH dependence and history of suicide attempt, now with persistent transaminitis and contact dermatitis     #Transaminitis - Suspect due to hepatic steatosis. No symptoms at this time. HIV and hepatitis negative. Recommend alcohol cessation. Also recommend diet and exercise. Educated patient that ultimately diet, exercise and alcohol cessation is the only therapy at this time. Outpt follow up with PCP.    # HLD - started on lipitor.      #Rash evaluated by derm at bedside - likely contact dermatitis from soap, started on Clobetasol cream BID and this morning much improved, to complete 7 day course     #ETOH abuse - taper, CIWA per psych - cw Thiamine FA    #psych - management per primary team - monitor QTC on psychotropics     Plan discussed with psychiatrist Dr. Nation. Patient can follow up at medicine clinic. Call [phone] to make an appointment.
46 y/o M with ETOH dependence and history of suicide attempt, now with persistent transaminitis and contact dermatitis       #Transaminitis - LFTs uptrending. No symptoms at this time. Most likely alcohol-related, possible hepatic steatosis. Patient now agreeable to repeat blood draw to monitor level. Awaiting hep panel and HIV. If continues to uptrend check hepatic sono.      #Rash evaluated by derm at bedside - likely contact dermatitis from soap, started on Clobetasol cream BID and this morning much improved, to complete 7 day course     #ETOH abuse - taper, CIWA per psych - cw Thiamine FA    #psych - management per primary team - monitor QTC on psychotropics
48 y/o M with ETOH dependence and history of suicide attempt, now with persistent transaminitis and contact dermatitis     #Transaminitis - LFTs uptrending. No symptoms at this time. Most likely alcohol-related, possible hepatic steatosis given elevated cholesterol. Patient now agreeable to US of liver to further evaluate transaminitis. Awaiting hep panel and HIV.      #Rash evaluated by derm at bedside - likely contact dermatitis from soap, started on Clobetasol cream BID and this morning much improved, to complete 7 day course     #ETOH abuse - taper, CIWA per psych - cw Thiamine FA    #psych - management per primary team - monitor QTC on psychotropics     Plan discussed with psychiatrist Dr. Nation
48 y/o M with ETOH dependence and history of suicide attempt, now with persistent transaminitis and contact dermatitis     Rash evaluated by derm at bedside - likely contact dermatitis from soap, started on Clobetasol cream BID and this morning much improved, to complete 7 day course     ETOH abuse - taper, CIWA per psych - cw Thiamine FA    transaminitis - LFT was creeping up - still most likely alcohol-related, possible hepatic steatosis. awaiting hep panel,  hepatic sono if pt agrreable - trend LFTs 2-3 x weekly     psych - management per primary team - monitor QTC on psychotropics
48 y/o M with ETOH dependence and history of suicide attempt, now with persistent transaminitis and palmar rash     Rash - possible allergic reaction, he used Kenrick soap which per derm can actually cause allergic reaction. only new med is librium but there is no sign of typical diffuse drug rash. dw derm, to evaluate - per derm hold off on starting meds until seen and evaluated - will monitor for progression or sloughing off of skin, which in that case will transfer to ED     ETOH abuse - taper, CIWA per psych - cw Thiamine FA    transaminitis - LFT creeping up - still most likely alcohol-related, possible hepatic steatosis. will check hep panel hepatic sono - trend LFTs 2-3 x weekly     psych - management per primary team - monitor QTC on psychotropics

## 2018-08-24 VITALS — TEMPERATURE: 98 F | HEART RATE: 72 BPM | DIASTOLIC BLOOD PRESSURE: 81 MMHG | SYSTOLIC BLOOD PRESSURE: 131 MMHG

## 2018-08-24 DIAGNOSIS — Z71.89 OTHER SPECIFIED COUNSELING: ICD-10-CM

## 2018-08-24 RX ADMIN — Medication 1 MILLIGRAM(S): at 08:01

## 2018-08-24 RX ADMIN — Medication 1 TABLET(S): at 08:01

## 2018-08-24 RX ADMIN — ESCITALOPRAM OXALATE 10 MILLIGRAM(S): 10 TABLET, FILM COATED ORAL at 08:01

## 2018-09-04 ENCOUNTER — OUTPATIENT (OUTPATIENT)
Dept: OUTPATIENT SERVICES | Facility: HOSPITAL | Age: 48
LOS: 1 days | Discharge: HOME | End: 2018-09-04

## 2018-09-04 DIAGNOSIS — Z98.890 OTHER SPECIFIED POSTPROCEDURAL STATES: Chronic | ICD-10-CM

## 2018-09-05 DIAGNOSIS — F10.20 ALCOHOL DEPENDENCE, UNCOMPLICATED: ICD-10-CM

## 2018-10-01 PROCEDURE — G9005: CPT

## 2018-12-21 ENCOUNTER — EMERGENCY (EMERGENCY)
Facility: HOSPITAL | Age: 48
LOS: 1 days | Discharge: HOME | End: 2018-12-21
Attending: EMERGENCY MEDICINE | Admitting: EMERGENCY MEDICINE
Payer: MEDICAID

## 2018-12-21 VITALS
HEART RATE: 107 BPM | RESPIRATION RATE: 15 BRPM | SYSTOLIC BLOOD PRESSURE: 145 MMHG | TEMPERATURE: 99 F | OXYGEN SATURATION: 98 % | DIASTOLIC BLOOD PRESSURE: 102 MMHG

## 2018-12-21 DIAGNOSIS — Z98.890 OTHER SPECIFIED POSTPROCEDURAL STATES: Chronic | ICD-10-CM

## 2018-12-21 PROCEDURE — 99283 EMERGENCY DEPT VISIT LOW MDM: CPT | Mod: 25

## 2018-12-21 NOTE — ED ADULT TRIAGE NOTE - CHIEF COMPLAINT QUOTE
Pt c/o "bleeding in mouth right behind my front teeth for about a month."  Pt reports that "when it happens, it's a lot of blood."

## 2018-12-22 VITALS
HEART RATE: 92 BPM | TEMPERATURE: 98 F | SYSTOLIC BLOOD PRESSURE: 120 MMHG | RESPIRATION RATE: 16 BRPM | DIASTOLIC BLOOD PRESSURE: 72 MMHG | OXYGEN SATURATION: 100 %

## 2018-12-22 LAB
ALBUMIN SERPL ELPH-MCNC: 4.5 G/DL — SIGNIFICANT CHANGE UP (ref 3.3–5)
ALP SERPL-CCNC: 121 U/L — HIGH (ref 40–120)
ALT FLD-CCNC: 94 U/L — HIGH (ref 4–41)
APTT BLD: 32.7 SEC — SIGNIFICANT CHANGE UP (ref 27.5–36.3)
AST SERPL-CCNC: 118 U/L — HIGH (ref 4–40)
BASOPHILS # BLD AUTO: 0.04 K/UL — SIGNIFICANT CHANGE UP (ref 0–0.2)
BASOPHILS NFR BLD AUTO: 1 % — SIGNIFICANT CHANGE UP (ref 0–2)
BILIRUB SERPL-MCNC: 0.3 MG/DL — SIGNIFICANT CHANGE UP (ref 0.2–1.2)
BUN SERPL-MCNC: 7 MG/DL — SIGNIFICANT CHANGE UP (ref 7–23)
CALCIUM SERPL-MCNC: 9.3 MG/DL — SIGNIFICANT CHANGE UP (ref 8.4–10.5)
CHLORIDE SERPL-SCNC: 102 MMOL/L — SIGNIFICANT CHANGE UP (ref 98–107)
CO2 SERPL-SCNC: 25 MMOL/L — SIGNIFICANT CHANGE UP (ref 22–31)
CREAT SERPL-MCNC: 0.57 MG/DL — SIGNIFICANT CHANGE UP (ref 0.5–1.3)
EOSINOPHIL # BLD AUTO: 0.13 K/UL — SIGNIFICANT CHANGE UP (ref 0–0.5)
EOSINOPHIL NFR BLD AUTO: 3.1 % — SIGNIFICANT CHANGE UP (ref 0–6)
GLUCOSE SERPL-MCNC: 80 MG/DL — SIGNIFICANT CHANGE UP (ref 70–99)
HCT VFR BLD CALC: 42.9 % — SIGNIFICANT CHANGE UP (ref 39–50)
HGB BLD-MCNC: 14.3 G/DL — SIGNIFICANT CHANGE UP (ref 13–17)
IMM GRANULOCYTES # BLD AUTO: 0.01 # — SIGNIFICANT CHANGE UP
IMM GRANULOCYTES NFR BLD AUTO: 0.2 % — SIGNIFICANT CHANGE UP (ref 0–1.5)
INR BLD: 1.17 — SIGNIFICANT CHANGE UP (ref 0.88–1.17)
LYMPHOCYTES # BLD AUTO: 1.88 K/UL — SIGNIFICANT CHANGE UP (ref 1–3.3)
LYMPHOCYTES # BLD AUTO: 45 % — HIGH (ref 13–44)
MCHC RBC-ENTMCNC: 32.1 PG — SIGNIFICANT CHANGE UP (ref 27–34)
MCHC RBC-ENTMCNC: 33.3 % — SIGNIFICANT CHANGE UP (ref 32–36)
MCV RBC AUTO: 96.4 FL — SIGNIFICANT CHANGE UP (ref 80–100)
MONOCYTES # BLD AUTO: 0.41 K/UL — SIGNIFICANT CHANGE UP (ref 0–0.9)
MONOCYTES NFR BLD AUTO: 9.8 % — SIGNIFICANT CHANGE UP (ref 2–14)
NEUTROPHILS # BLD AUTO: 1.71 K/UL — LOW (ref 1.8–7.4)
NEUTROPHILS NFR BLD AUTO: 40.9 % — LOW (ref 43–77)
NRBC # FLD: 0 — SIGNIFICANT CHANGE UP
PLATELET # BLD AUTO: 157 K/UL — SIGNIFICANT CHANGE UP (ref 150–400)
PMV BLD: 10.6 FL — SIGNIFICANT CHANGE UP (ref 7–13)
POTASSIUM SERPL-MCNC: 4 MMOL/L — SIGNIFICANT CHANGE UP (ref 3.5–5.3)
POTASSIUM SERPL-SCNC: 4 MMOL/L — SIGNIFICANT CHANGE UP (ref 3.5–5.3)
PROT SERPL-MCNC: 6.9 G/DL — SIGNIFICANT CHANGE UP (ref 6–8.3)
PROTHROM AB SERPL-ACNC: 13.1 SEC — SIGNIFICANT CHANGE UP (ref 9.8–13.1)
RBC # BLD: 4.45 M/UL — SIGNIFICANT CHANGE UP (ref 4.2–5.8)
RBC # FLD: 14 % — SIGNIFICANT CHANGE UP (ref 10.3–14.5)
SODIUM SERPL-SCNC: 144 MMOL/L — SIGNIFICANT CHANGE UP (ref 135–145)
WBC # BLD: 4.18 K/UL — SIGNIFICANT CHANGE UP (ref 3.8–10.5)
WBC # FLD AUTO: 4.18 K/UL — SIGNIFICANT CHANGE UP (ref 3.8–10.5)

## 2018-12-22 NOTE — ED PROVIDER NOTE - MEDICAL DECISION MAKING DETAILS
48M w/ above history p/w bleeding from palate of mouth, concerning for coagulopathy 2/2 liver dysfunction from EtOH use  -labs, reassess

## 2018-12-22 NOTE — ED PROVIDER NOTE - NSFOLLOWUPCLINICS_GEN_ALL_ED_FT
[facility] General Internal Medicine  General Internal Medicine  [address] [id number]  Phone: [phone]  Fax:   Follow Up Time:

## 2018-12-22 NOTE — ED PROVIDER NOTE - OBJECTIVE STATEMENT
48M h/o alcohol dependence presenting with bleeding from mouth. Notes bleeding from vesicle on hard palate on mouth behind central incisors, started months ago, occurring more frequently. Over past week has bleeding has occurred with eating, pt saw dentist and was referred to oral surgeon but has not f/u yet. Denies F, CP/SOB, abd pain, N/V/D

## 2018-12-22 NOTE — ED PROVIDER NOTE - ATTENDING CONTRIBUTION TO CARE
Dr. Jones:  I have personally performed a face to face bedside history and physical examination of this patient. I have discussed the history, examination, review of systems, assessment and plan of management with the resident. I have reviewed the electronic medical record and amended it to reflect my history, review of systems, physical exam, assessment and plan.    48M h/o daily alcohol use presents with intermittent bleeding from mouth over past week.  Saw dentist and was referred to oral surgeon.  States that when he eats, sometimes his mouth becomes "full of blood", feels a "pimple" on his palate just behind front teeth, which he believes is the source of bleeding.  denies easy bruising/bleeding elsewhere in body    Exam:  - nad  - rrr  - ctab  - abd soft ntnd  - a single vesicle on hard palate just behind his two front teeth, no signs of bleeding and no other intra-oral lesions appreciated    A/P  - bleeding possibly secondary to vesicle in mouth, eval platelets and coags  - cbc, cmp, pt/ptt

## 2018-12-22 NOTE — ED ADULT NURSE NOTE - NSIMPLEMENTINTERV_GEN_ALL_ED
Implemented All Universal Safety Interventions:  [place] to call system. Call bell, personal items and telephone within reach. Instruct patient to call for assistance. Room bathroom lighting operational. Non-slip footwear when patient is off stretcher. Physically safe environment: no spills, clutter or unnecessary equipment. Stretcher in lowest position, wheels locked, appropriate side rails in place.

## 2018-12-22 NOTE — ED ADULT NURSE NOTE - OBJECTIVE STATEMENT
Pt present to rm 10, A&Ox4, ambulatory at baseline w/o assistance, here for evaluation of intermittent bleeding in mouth, states "it is a lot of blood when it starts but it stopped today at around 22:00." Denies chest pain, shortness of breath, palpitations, diaphoresis, headaches, fevers, dizziness, nausea, vomiting, diarrhea, or urinary symptoms at this time. IV established in left AC with a 20G, labs drawn and sent, call bell in reach, warm blanket provided, bed in lowest position, side rails up x2. Patient stable & NAD. Respirations even and unlabored, airway patent. Will continue to monitor.

## 2019-05-01 ENCOUNTER — OUTPATIENT (OUTPATIENT)
Dept: OUTPATIENT SERVICES | Facility: HOSPITAL | Age: 49
LOS: 1 days | End: 2019-05-01
Payer: MEDICAID

## 2019-05-01 DIAGNOSIS — Z98.890 OTHER SPECIFIED POSTPROCEDURAL STATES: Chronic | ICD-10-CM

## 2019-05-01 PROCEDURE — G9001: CPT

## 2019-05-19 ENCOUNTER — EMERGENCY (EMERGENCY)
Facility: HOSPITAL | Age: 49
LOS: 1 days | Discharge: HOME | End: 2019-05-19
Attending: EMERGENCY MEDICINE
Payer: MEDICAID

## 2019-05-19 VITALS
TEMPERATURE: 98 F | SYSTOLIC BLOOD PRESSURE: 128 MMHG | RESPIRATION RATE: 18 BRPM | OXYGEN SATURATION: 97 % | DIASTOLIC BLOOD PRESSURE: 75 MMHG | HEART RATE: 96 BPM | WEIGHT: 160.06 LBS

## 2019-05-19 DIAGNOSIS — Z98.890 OTHER SPECIFIED POSTPROCEDURAL STATES: Chronic | ICD-10-CM

## 2019-05-19 PROCEDURE — 99284 EMERGENCY DEPT VISIT MOD MDM: CPT | Mod: 25

## 2019-05-19 PROCEDURE — 90792 PSYCH DIAG EVAL W/MED SRVCS: CPT

## 2019-05-19 PROCEDURE — 93010 ELECTROCARDIOGRAM REPORT: CPT

## 2019-05-20 VITALS
DIASTOLIC BLOOD PRESSURE: 76 MMHG | OXYGEN SATURATION: 98 % | TEMPERATURE: 98 F | SYSTOLIC BLOOD PRESSURE: 122 MMHG | HEART RATE: 103 BPM

## 2019-05-20 DIAGNOSIS — F10.10 ALCOHOL ABUSE, UNCOMPLICATED: ICD-10-CM

## 2019-05-20 DIAGNOSIS — F32.9 MAJOR DEPRESSIVE DISORDER, SINGLE EPISODE, UNSPECIFIED: ICD-10-CM

## 2019-05-20 LAB
ALBUMIN SERPL ELPH-MCNC: 4.9 G/DL — SIGNIFICANT CHANGE UP (ref 3.3–5)
ALP SERPL-CCNC: 158 U/L — HIGH (ref 40–120)
ALT FLD-CCNC: 334 U/L — HIGH (ref 10–45)
ANION GAP SERPL CALC-SCNC: 17 MMOL/L — SIGNIFICANT CHANGE UP (ref 5–17)
APAP SERPL-MCNC: <15 UG/ML — SIGNIFICANT CHANGE UP (ref 10–30)
APPEARANCE UR: CLEAR — SIGNIFICANT CHANGE UP
AST SERPL-CCNC: 390 U/L — HIGH (ref 10–40)
BACTERIA # UR AUTO: 0 — SIGNIFICANT CHANGE UP
BASOPHILS # BLD AUTO: 0.1 K/UL — SIGNIFICANT CHANGE UP (ref 0–0.2)
BASOPHILS NFR BLD AUTO: 1.4 % — SIGNIFICANT CHANGE UP (ref 0–2)
BILIRUB DIRECT SERPL-MCNC: 0.2 MG/DL — SIGNIFICANT CHANGE UP (ref 0–0.2)
BILIRUB INDIRECT FLD-MCNC: 0.3 MG/DL — SIGNIFICANT CHANGE UP (ref 0.2–1)
BILIRUB SERPL-MCNC: 0.5 MG/DL — SIGNIFICANT CHANGE UP (ref 0.2–1.2)
BILIRUB UR-MCNC: NEGATIVE — SIGNIFICANT CHANGE UP
BUN SERPL-MCNC: <4 MG/DL — LOW (ref 7–23)
CALCIUM SERPL-MCNC: 9.9 MG/DL — SIGNIFICANT CHANGE UP (ref 8.4–10.5)
CHLORIDE SERPL-SCNC: 100 MMOL/L — SIGNIFICANT CHANGE UP (ref 96–108)
CO2 SERPL-SCNC: 23 MMOL/L — SIGNIFICANT CHANGE UP (ref 22–31)
COLOR SPEC: COLORLESS — SIGNIFICANT CHANGE UP
CREAT SERPL-MCNC: 0.51 MG/DL — SIGNIFICANT CHANGE UP (ref 0.5–1.3)
DIFF PNL FLD: NEGATIVE — SIGNIFICANT CHANGE UP
EOSINOPHIL # BLD AUTO: 0.2 K/UL — SIGNIFICANT CHANGE UP (ref 0–0.5)
EOSINOPHIL NFR BLD AUTO: 2.9 % — SIGNIFICANT CHANGE UP (ref 0–6)
EPI CELLS # UR: 0 /HPF — SIGNIFICANT CHANGE UP
ETHANOL SERPL-MCNC: 227 MG/DL — HIGH (ref 0–10)
ETHANOL SERPL-MCNC: 328 MG/DL — HIGH (ref 0–10)
ETHANOL SERPL-MCNC: 69 MG/DL — HIGH (ref 0–10)
GLUCOSE SERPL-MCNC: 115 MG/DL — HIGH (ref 70–99)
GLUCOSE UR QL: NEGATIVE — SIGNIFICANT CHANGE UP
HCT VFR BLD CALC: 47.5 % — SIGNIFICANT CHANGE UP (ref 39–50)
HGB BLD-MCNC: 15.3 G/DL — SIGNIFICANT CHANGE UP (ref 13–17)
HYALINE CASTS # UR AUTO: 0 /LPF — SIGNIFICANT CHANGE UP (ref 0–2)
KETONES UR-MCNC: NEGATIVE — SIGNIFICANT CHANGE UP
LEUKOCYTE ESTERASE UR-ACNC: NEGATIVE — SIGNIFICANT CHANGE UP
LYMPHOCYTES # BLD AUTO: 2.4 K/UL — SIGNIFICANT CHANGE UP (ref 1–3.3)
LYMPHOCYTES # BLD AUTO: 36.3 % — SIGNIFICANT CHANGE UP (ref 13–44)
MAGNESIUM SERPL-MCNC: 2.1 MG/DL — SIGNIFICANT CHANGE UP (ref 1.6–2.6)
MCHC RBC-ENTMCNC: 32.2 PG — SIGNIFICANT CHANGE UP (ref 27–34)
MCHC RBC-ENTMCNC: 32.3 GM/DL — SIGNIFICANT CHANGE UP (ref 32–36)
MCV RBC AUTO: 99.8 FL — SIGNIFICANT CHANGE UP (ref 80–100)
MONOCYTES # BLD AUTO: 0.5 K/UL — SIGNIFICANT CHANGE UP (ref 0–0.9)
MONOCYTES NFR BLD AUTO: 7.5 % — SIGNIFICANT CHANGE UP (ref 2–14)
NEUTROPHILS # BLD AUTO: 3.4 K/UL — SIGNIFICANT CHANGE UP (ref 1.8–7.4)
NEUTROPHILS NFR BLD AUTO: 52 % — SIGNIFICANT CHANGE UP (ref 43–77)
NITRITE UR-MCNC: NEGATIVE — SIGNIFICANT CHANGE UP
PCP SPEC-MCNC: SIGNIFICANT CHANGE UP
PH UR: 6 — SIGNIFICANT CHANGE UP (ref 5–8)
PHOSPHATE SERPL-MCNC: 3.9 MG/DL — SIGNIFICANT CHANGE UP (ref 2.5–4.5)
PLATELET # BLD AUTO: 151 K/UL — SIGNIFICANT CHANGE UP (ref 150–400)
POTASSIUM SERPL-MCNC: 3.9 MMOL/L — SIGNIFICANT CHANGE UP (ref 3.5–5.3)
POTASSIUM SERPL-SCNC: 3.9 MMOL/L — SIGNIFICANT CHANGE UP (ref 3.5–5.3)
PROT SERPL-MCNC: 8 G/DL — SIGNIFICANT CHANGE UP (ref 6–8.3)
PROT UR-MCNC: NEGATIVE — SIGNIFICANT CHANGE UP
RBC # BLD: 4.76 M/UL — SIGNIFICANT CHANGE UP (ref 4.2–5.8)
RBC # FLD: 12 % — SIGNIFICANT CHANGE UP (ref 10.3–14.5)
RBC CASTS # UR COMP ASSIST: 0 /HPF — SIGNIFICANT CHANGE UP (ref 0–4)
SALICYLATES SERPL-MCNC: <2 MG/DL — LOW (ref 15–30)
SODIUM SERPL-SCNC: 140 MMOL/L — SIGNIFICANT CHANGE UP (ref 135–145)
SP GR SPEC: 1 — LOW (ref 1.01–1.02)
UROBILINOGEN FLD QL: NEGATIVE — SIGNIFICANT CHANGE UP
WBC # BLD: 6.6 K/UL — SIGNIFICANT CHANGE UP (ref 3.8–10.5)
WBC # FLD AUTO: 6.6 K/UL — SIGNIFICANT CHANGE UP (ref 3.8–10.5)
WBC UR QL: 0 /HPF — SIGNIFICANT CHANGE UP (ref 0–5)

## 2019-05-20 PROCEDURE — 80307 DRUG TEST PRSMV CHEM ANLYZR: CPT

## 2019-05-20 PROCEDURE — 81001 URINALYSIS AUTO W/SCOPE: CPT

## 2019-05-20 PROCEDURE — 84100 ASSAY OF PHOSPHORUS: CPT

## 2019-05-20 PROCEDURE — 85027 COMPLETE CBC AUTOMATED: CPT

## 2019-05-20 PROCEDURE — 93005 ELECTROCARDIOGRAM TRACING: CPT

## 2019-05-20 PROCEDURE — 83735 ASSAY OF MAGNESIUM: CPT

## 2019-05-20 PROCEDURE — 80076 HEPATIC FUNCTION PANEL: CPT

## 2019-05-20 PROCEDURE — 99285 EMERGENCY DEPT VISIT HI MDM: CPT | Mod: 25

## 2019-05-20 PROCEDURE — 80048 BASIC METABOLIC PNL TOTAL CA: CPT

## 2019-05-20 RX ADMIN — Medication 50 MILLIGRAM(S): at 08:52

## 2019-05-20 NOTE — CHART NOTE - NSCHARTNOTEFT_GEN_A_CORE
Covering :  LMSW met with patient at bedside again as patient as he is requesting Substance Abuse Resources for Outpatient Services. LMSW provided patient with out patient/in patient substance abuse resources. Patient reported that he was attending [facility] for Outpatient Mental Health Services but stopped going due to "the environment there". Patient reported that he would be in agreement to see a mental health provider in the community. LMSW provided patient with outpatient mental health providers located within close proximity to patient's home. Patient expressed appreciation. LMSW assured ongoing availability. Social Work will continue to remain available and collaborate with interdisciplinary.

## 2019-05-20 NOTE — ED BEHAVIORAL HEALTH NOTE - BEHAVIORAL HEALTH NOTE
Mr Miranda was received asleep this am and currently on 1:1 CO for safety due to SA prior to admission. Pt scored 1 on the CIWA scale at 0830H, pt is calm and said he has a slight headache due to lack os sleep maintained pt on 1:1 as ordered, evalute pt for CIWA scale as scheduled

## 2019-05-20 NOTE — ED ADULT NURSE NOTE - OBJECTIVE STATEMENT
Pt presents to ED after calling EMS himself for suicidal thoughts, pt AXOX3, pt reports suicidal thoughts, reports multi year history of thoughts or harming self,  pt states he has had a plan to harm self in past with an attempt in the last year, no auditory or visual hallucinations, states he was drinking beer today, pt reports ETOH intake between 10-20 beers daily, reports history of hospitalization for withdrawal in the past, pt reports nausea, no tremor or other withdrawal symptoms noted. Pt denies thoughts of harm to others. Pt denies shortness of breath, no chest pain, pt reports feeling "feverish" at night. No dysuria or hematuria.

## 2019-05-20 NOTE — ED PROVIDER NOTE - FAMILY HISTORY
Family history of depression, mother     Sibling  Still living? Unknown  Family history of alcohol abuse, Age at diagnosis: Age Unknown

## 2019-05-20 NOTE — ED PROVIDER NOTE - CLINICAL SUMMARY MEDICAL DECISION MAKING FREE TEXT BOX
DALILA Redd MD: Pt is a 48M with hx of depression non-compliant with antidepressants x months, daily ETOH use (last use hours pta), psych hospitalizations for SA who p/w SI. Patient does not endorse active plan but states he "has many ways". 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 He describes the feeling as if he has to pull a car that has run out of gas. Denies HI/AH/VH. Denies CP, SOB, headache. Will obtain labs for medical clearance and Psychiatric consultation due to concern for active SI in pt who is noncompliant with medications. 1:1 observation ordered.

## 2019-05-20 NOTE — ED BEHAVIORAL HEALTH ASSESSMENT NOTE - RISK ASSESSMENT
pt overall chronic moderate risk for suicide attempt given hx suicide attempt, depression, ongoing alcohol use, pt has current passive si, protective factors include future oriented, supportive family no active si.

## 2019-05-20 NOTE — ED PROVIDER NOTE - NSFOLLOWUPINSTRUCTIONS_ED_ALL_ED_FT
- Please follow up with the out patient substance abuse and psychiatry offices provided to you by our .   - Please return with any new or worsening symptoms

## 2019-05-20 NOTE — CHART NOTE - NSCHARTNOTEFT_GEN_A_CORE
Social work referred to patient in ED via [facility] Screening process. Medical chart reviewed. Patient is a “48M with hx of depression non-compliant with antidepressants x months, daily ETOH use (last use hours pta), psych hospitalization for SA here for SI. Patient does not endorse active plan but states he "has many ways". 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 He describes the feeling as if he has to pull a car that has run out of gas. Denies HI/AH/VH. Denies CP, SOB, headache.”  LMSW met with patient at bedside, introduced self and social work role to which patient verbalized understanding. Patient reported that he resides in an apartment in [place], [place] with his emergency contact/father Arley Miranda [phone]. Patient reported that he has no additional emergency contacts. Patient reported that he is the primary caregiver for his father. Patient explained that his father is able to care for himself while patient is hospitalized; patient’s father is independent with ADL’s and ambulation. Patient declined to identify a caregiver. Patient without advanced directives. Patient’s father is his surrogate decision maker.   Patient reports being independent in ADLs and ambulation prior to admission. Patient denied HHA/RN services prior to admission. Patient reported that he is currently unemployed and reports that his father financially supports him.  Patient has [place] [employer] insurance benefits.   Patient reports suicidal thoughts with long standing thoughts or harming self. Patient reported that he has a plan in place “to hang himself”. Patient denied weapons in the home. Patient denied intent to harm others. Patient reported that he has been “feeling more stressed at home being the caregiver for an elderly parent” and that “everyday feels like groundhogs day”. Patient reported that he has a sister but that she is not involved. Patient stated that he does not have supportive network of family, friends, acquaintances that he can depend on. Patient reported that he drinks 15-20 beers to help cope with his situation and “just pass time”. Patient reports history of hospitalization for withdrawal in the past. Patient currently on CIWA of 1. Patient reported that he occasionally smokes pot.  LMSW provided extensive emotional support, active listening, and validated patient’s feelings. Patient explained that he was seeing a mental health provider in the community but “felt it was a waste of time”.  Patient reported that he did have an inpatient psych admission at [facility] in 2018. LMSW offered patient substance abuse resources to which patient declined.   Patient reports clear communication from medical team regarding plan of care.  No financial/cultural needs identified. Spiritual support remains available. Contact information provided and ongoing social work availability assured.  No anticipated discharge date reported. Discharge planning is ongoing pending hospital course and needs will continue to be assessed. Social work will continue to follow for ongoing support and discharge planning efforts.

## 2019-05-20 NOTE — ED BEHAVIORAL HEALTH ASSESSMENT NOTE - HPI (INCLUDE ILLNESS QUALITY, SEVERITY, DURATION, TIMING, CONTEXT, MODIFYING FACTORS, ASSOCIATED SIGNS AND SYMPTOMS)
49 y/o male, single, domiciled with father, hx depression, anxiety, alcohol abuse, prior psych admission [facility] 2018 for suicide attempt, not in current psych tx, non-compliant with antidepressants x months, bib EMS for alcohol intoxication, depression, SI. Pt states he has been feeling overwhelmed taking care of his father who is medically ill, needs help with some ADLS, some memory issues. Pt reports feeling depressed for years, anhedonic, not motivated to do much. Pt reported increasing SI, mostly passive SI, no current active SI, no current intent and plan. Pt feels he is a burden to his family, friends, feels hopeless at times. Pt denies access to guns. Pt denies panic attacks, psychosis, grisel. Pt reports drinking 12-15 beers daily the past few years, usually drinks at home in the evenings. pt denies other drug use. Pt denies current withdrawal symptoms. Pt ambivalent about getting treatment, doesn't want to leave his father alone in the home.   collateral obtained from father, he does not have concern for pt's safety. He is concerned pt has a "split personality", sometimes he is ok and then other days he is depressed, drinking heavily.

## 2019-05-20 NOTE — ED PROVIDER NOTE - PROGRESS NOTE DETAILS
ED Sign Out, eval for SI complicated by alcohol abuse/withdrawal, will need admission for to medicine for psyhc and ciwa -- Jeffrey Bradley MD stable/uncompliacted overnight, reassess and Psych consult pending at ED Sign Out (7AM, [place]). Psychiatry aware of patient - will not be able to fully assess until BAL < 100. Will repeat BAL around 11am and re-call psych for eval.  Pt remains calm, cooperative, resting comfortably but continues to endorse SI.  - Deonna Angel, DO Pt seen and cleared by psychiatry.  Pt continues to be calm and cooperative.  No tremors.  Ambulating easily without assistance.  Pt given out patient substance about and psychiatry out patient follow up.  - Deonna Angel,

## 2019-05-20 NOTE — ED PROVIDER NOTE - OBJECTIVE STATEMENT
So Ke, DO: 48M with hx of depression non-compliant with antidepressants x months, daily ETOH use (last use hours pta), psych hospitalization for SA here for SI. Patient does not endorse active plan but states he "has many ways". 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 He describes the feeling as if he has to pull a car that has run out of gas. Denies HI/AH/VH. Denies CP, SOB, headache.

## 2019-05-20 NOTE — ED PROVIDER NOTE - PHYSICAL EXAMINATION
So Dempsey, DO:   Gen: Well appearing but clinically intoxicated, NAD  Head: NCAT  HEENT: PERRL, MMM, normal conjunctiva, anicteric, neck supple  Lung: CTAB, no adventitious sounds  CV: RRR, no murmurs  MSK: No edema, no visible deformities  Neuro: Ambulatory with stable gait.   Skin: Warm and dry, no evidence of rash  Psych: poor eye contact, depressed mood

## 2019-05-20 NOTE — ED ADULT NURSE NOTE - INSIGHT (REGARDING PSYCHIATRIC ILLNESS)
Reason For Visit  PHILIP HKAN is here today for a nurse visit for immunizations High Dose FLU Vaccine.      Current Meds   1. Atorvastatin Calcium 20 MG Oral Tablet;   Therapy: (Recorded:[id number]) to Recorded   2. Doxycycline 40 MG Oral Capsule Delayed Release;   Therapy: 05May2017 to Recorded   3. Levothyroxine Sodium 50 MCG Oral Tablet;   Therapy: (Recorded:[id number]) to Recorded   4. Lisinopril 10 MG Oral Tablet;   Therapy: (Recorded:[id number]) to Recorded   5. Metoprolol Tartrate 25 MG Oral Tablet;   Therapy: (Recorded:[id number]) to Recorded   6. Nystatin [id number] UNIT/GM External Cream; APPLY  AND RUB  IN A THIN FILM TO   AFFECTED AREAS TWICE DAILY.(AM AND PM);   Therapy: 05May2017 to (Last Rx:05May2017)  Requested for: 05May2017 Ordered    Allergies  codeine  Penicillins    Screening  Vaccine Screening (Adult):   1. Are you sick today? No.   2. Do you have allergies to medications, food, a vaccine component, or latex? No.   3. Have you ever had a serious reaction after receiving a vaccination? No.   4. Do you have long-term health problems with heart disease, lung disease, asthma, kidney disease, metabolic disease (e.g. diabetes), anemia, or other blood disorder? No.   5. Do you have cancer, leukemia, HIV/AIDS, or any other immune system problem? No.   6. In the past 3 months, have you taken medications that affect your immune system, such as cortisone, prednisone, other steroids, or anticancer drugs; drugs for the treatment of rheumatoid arthritis, Crohn's disease, or psoriasis; or have you had radiation treatments? No.   7. Have you had a seizure or a brain or other nervous system problem? No.   8. During the past year, have you received a transfusion of blood or blood products, or been given immune (gamma) globulin or an antiviral drug? No.   9. For Women: Are you pregnant or is there a chance you could become pregnant? No.   10. Have you received any vaccinations in the past 4 weeks? No.       See  scanned screening form in the patients chart.       Assessment   1. Need for influenza vaccination (Z23)    Plan   1. Fluzone High-Dose 0.5 ML Intramuscular Suspension Prefilled Syringe    Signatures   Electronically signed by : FLORI WELLINGTON; Jan 31 2018  1:36PM CST     Fair

## 2019-05-20 NOTE — ED BEHAVIORAL HEALTH ASSESSMENT NOTE - SUMMARY
47 y/o male, single, domiciled with father, hx depression, anxiety, alcohol abuse, prior psych admission [facility] 2018 for suicide attempt, not in current psych tx, non-compliant with antidepressants x months, bib EMS for alcohol intoxication, depression, SI. Pt states he has been feeling overwhelmed taking care of his father who is medically ill, needs help with some ADLS, some memory issues. Pt reports feeling depressed for years, anhedonic, not motivated to do much. Pt reported increasing SI, mostly passive SI, no current active SI, no current intent and plan. Pt feels he is a burden to his family, friends, feels hopeless at times. Pt denies access to guns. Pt denies panic attacks, psychosis, grisel. Pt reports drinking 12-15 beers daily the past few years, no withdrawal symptoms. father not concerned for safety

## 2019-05-21 DIAGNOSIS — Z71.89 OTHER SPECIFIED COUNSELING: ICD-10-CM

## 2019-06-14 ENCOUNTER — EMERGENCY (EMERGENCY)
Facility: HOSPITAL | Age: 49
LOS: 1 days | Discharge: HOME | End: 2019-06-14
Admitting: EMERGENCY MEDICINE
Payer: MEDICAID

## 2019-06-14 VITALS
OXYGEN SATURATION: 99 % | SYSTOLIC BLOOD PRESSURE: 130 MMHG | HEART RATE: 106 BPM | RESPIRATION RATE: 18 BRPM | DIASTOLIC BLOOD PRESSURE: 77 MMHG | TEMPERATURE: 99 F

## 2019-06-14 DIAGNOSIS — F32.9 MAJOR DEPRESSIVE DISORDER, SINGLE EPISODE, UNSPECIFIED: ICD-10-CM

## 2019-06-14 DIAGNOSIS — Z98.890 OTHER SPECIFIED POSTPROCEDURAL STATES: Chronic | ICD-10-CM

## 2019-06-14 DIAGNOSIS — F10.10 ALCOHOL ABUSE, UNCOMPLICATED: ICD-10-CM

## 2019-06-14 LAB
ALBUMIN SERPL ELPH-MCNC: 5.1 G/DL — HIGH (ref 3.3–5)
ALP SERPL-CCNC: 154 U/L — HIGH (ref 40–120)
ALT FLD-CCNC: 153 U/L — HIGH (ref 4–41)
AMPHET UR-MCNC: NEGATIVE — SIGNIFICANT CHANGE UP
ANION GAP SERPL CALC-SCNC: 19 MMO/L — HIGH (ref 7–14)
APAP SERPL-MCNC: < 15 UG/ML — LOW (ref 15–25)
AST SERPL-CCNC: 291 U/L — HIGH (ref 4–40)
BARBITURATES UR SCN-MCNC: NEGATIVE — SIGNIFICANT CHANGE UP
BASOPHILS # BLD AUTO: 0.06 K/UL — SIGNIFICANT CHANGE UP (ref 0–0.2)
BASOPHILS NFR BLD AUTO: 1.1 % — SIGNIFICANT CHANGE UP (ref 0–2)
BENZODIAZ UR-MCNC: NEGATIVE — SIGNIFICANT CHANGE UP
BILIRUB SERPL-MCNC: 0.6 MG/DL — SIGNIFICANT CHANGE UP (ref 0.2–1.2)
BUN SERPL-MCNC: 5 MG/DL — LOW (ref 7–23)
CALCIUM SERPL-MCNC: 9.6 MG/DL — SIGNIFICANT CHANGE UP (ref 8.4–10.5)
CANNABINOIDS UR-MCNC: POSITIVE — SIGNIFICANT CHANGE UP
CHLORIDE SERPL-SCNC: 102 MMOL/L — SIGNIFICANT CHANGE UP (ref 98–107)
CO2 SERPL-SCNC: 23 MMOL/L — SIGNIFICANT CHANGE UP (ref 22–31)
COCAINE METAB.OTHER UR-MCNC: NEGATIVE — SIGNIFICANT CHANGE UP
CREAT SERPL-MCNC: 0.54 MG/DL — SIGNIFICANT CHANGE UP (ref 0.5–1.3)
EOSINOPHIL # BLD AUTO: 0.09 K/UL — SIGNIFICANT CHANGE UP (ref 0–0.5)
EOSINOPHIL NFR BLD AUTO: 1.6 % — SIGNIFICANT CHANGE UP (ref 0–6)
ETHANOL BLD-MCNC: 286 MG/DL — HIGH
GLUCOSE SERPL-MCNC: 94 MG/DL — SIGNIFICANT CHANGE UP (ref 70–99)
HCT VFR BLD CALC: 46.4 % — SIGNIFICANT CHANGE UP (ref 39–50)
HGB BLD-MCNC: 15.7 G/DL — SIGNIFICANT CHANGE UP (ref 13–17)
IMM GRANULOCYTES NFR BLD AUTO: 0.4 % — SIGNIFICANT CHANGE UP (ref 0–1.5)
LYMPHOCYTES # BLD AUTO: 1.66 K/UL — SIGNIFICANT CHANGE UP (ref 1–3.3)
LYMPHOCYTES # BLD AUTO: 29.7 % — SIGNIFICANT CHANGE UP (ref 13–44)
MCHC RBC-ENTMCNC: 33.4 PG — SIGNIFICANT CHANGE UP (ref 27–34)
MCHC RBC-ENTMCNC: 33.8 % — SIGNIFICANT CHANGE UP (ref 32–36)
MCV RBC AUTO: 98.7 FL — SIGNIFICANT CHANGE UP (ref 80–100)
METHADONE UR-MCNC: NEGATIVE — SIGNIFICANT CHANGE UP
MONOCYTES # BLD AUTO: 0.33 K/UL — SIGNIFICANT CHANGE UP (ref 0–0.9)
MONOCYTES NFR BLD AUTO: 5.9 % — SIGNIFICANT CHANGE UP (ref 2–14)
NEUTROPHILS # BLD AUTO: 3.43 K/UL — SIGNIFICANT CHANGE UP (ref 1.8–7.4)
NEUTROPHILS NFR BLD AUTO: 61.3 % — SIGNIFICANT CHANGE UP (ref 43–77)
NRBC # FLD: 0 K/UL — SIGNIFICANT CHANGE UP (ref 0–0)
OPIATES UR-MCNC: NEGATIVE — SIGNIFICANT CHANGE UP
OXYCODONE UR-MCNC: NEGATIVE — SIGNIFICANT CHANGE UP
PCP UR-MCNC: NEGATIVE — SIGNIFICANT CHANGE UP
PLATELET # BLD AUTO: 164 K/UL — SIGNIFICANT CHANGE UP (ref 150–400)
PMV BLD: 10.7 FL — SIGNIFICANT CHANGE UP (ref 7–13)
POTASSIUM SERPL-MCNC: 4.5 MMOL/L — SIGNIFICANT CHANGE UP (ref 3.5–5.3)
POTASSIUM SERPL-SCNC: 4.5 MMOL/L — SIGNIFICANT CHANGE UP (ref 3.5–5.3)
PROT SERPL-MCNC: 7.5 G/DL — SIGNIFICANT CHANGE UP (ref 6–8.3)
RBC # BLD: 4.7 M/UL — SIGNIFICANT CHANGE UP (ref 4.2–5.8)
RBC # FLD: 12.9 % — SIGNIFICANT CHANGE UP (ref 10.3–14.5)
SALICYLATES SERPL-MCNC: < 5 MG/DL — LOW (ref 15–30)
SODIUM SERPL-SCNC: 144 MMOL/L — SIGNIFICANT CHANGE UP (ref 135–145)
TSH SERPL-MCNC: 2.04 UIU/ML — SIGNIFICANT CHANGE UP (ref 0.27–4.2)
WBC # BLD: 5.59 K/UL — SIGNIFICANT CHANGE UP (ref 3.8–10.5)
WBC # FLD AUTO: 5.59 K/UL — SIGNIFICANT CHANGE UP (ref 3.8–10.5)

## 2019-06-14 PROCEDURE — 99283 EMERGENCY DEPT VISIT LOW MDM: CPT

## 2019-06-14 PROCEDURE — 90792 PSYCH DIAG EVAL W/MED SRVCS: CPT | Mod: GC

## 2019-06-14 NOTE — ED PROVIDER NOTE - CHIEF COMPLAINT
The patient is a 48y Male complaining of suicidal attempt. The patient is a 68y Male complaining of bloody stools.

## 2019-06-14 NOTE — ED PROVIDER NOTE - OBJECTIVE STATEMENT
47 y/o M hx  Depression,  BIBA  w c/o  agitation s suicidal ideation.    Denies HI/AH/VH.  Denies falling, punching  or kicking any objects.  Denies pain, SOB, fever, chills, chest/abdominal discomfort.   No evidence of physical injuries, broken skin or deformities. Denies recent use of alcohol or other illicit drugs. 47 y/o M hx Depression   BIBA  w c/o  agitation  attempting to stab himself. Denies actually stabbing himself.  No visible injuries noted. Denies HI/AH/VH.  Denies falling, punching  or kicking any objects. Admits to multiple to multiple stressors.  Denies pain, SOB, fever, chills, chest/abdominal discomfort.   No evidence of physical injuries, broken skin or deformities. Denies recent use of alcohol or other illicit drugs.

## 2019-06-14 NOTE — ED ADULT TRIAGE NOTE - CHIEF COMPLAINT QUOTE
Pt was attempting to stab himself with a knife and police stopped him.  No lacerations.  Attempted to hang himself last night.  Bruise to left neck noted.  Off meds for 10 months.  PMH bipolar disorder

## 2019-06-14 NOTE — ED BEHAVIORAL HEALTH ASSESSMENT NOTE - RISK ASSESSMENT
Risk factors: Single, male, depression, anxiety, impulsivity, prior suicidality, 1 prior attempt, 1 prior hospitalizations, active ETOH abuse, multiple stressors,  absence of outpatient follow-up, medication non- compliance, previous intoxication.      Protective factors: denies any active suicidal ideation/intent/plan, has responsibility to father, fear of death,     Based on risk assessment evaluation, Pt does not  appear to be at imminent risk of harm to self or others at this time. Pt's chronic risk elevated. moderate to high risk , but not imminent risk for self harm . Pt is an  active ETOH abuse which  he is not ready to address or get help his drinking issues as well as his mood  . He does not meet criteria for involuntary admission at this time  as he repeatedly denies any si/i/p when interviewed when  he is sober and provides protective factors including fear of going to hell and and that he seeks help each time he is suicidal .  His risk is high/acute when he is  intoxicated , once he is sober the risk is  chronic. He can benefot  from inpatient detox and drug treatment but declines both drug treatment and psychiatric treatment .  Unmodifiable risk factors : Single, male, hx of sa.   Risk factors  EToh abuse, multiple stressors,  absence of outpatient follow-up, medication non- compliance,  Protective factors: denies any active suicidal ideation/intent/plan, has responsibility to father, fear of death,     Based on risk assessment evaluation, Pt does not  appear to be at imminent risk of harm to self or others at this time. Pt's chronic risk elevated.

## 2019-06-14 NOTE — ED BEHAVIORAL HEALTH ASSESSMENT NOTE - DETAILS
mother depression NP pt reports daily SI and plan but currently denies. few years ago admitted to ICU for withdrawal

## 2019-06-14 NOTE — ED BEHAVIORAL HEALTH ASSESSMENT NOTE - SAFETY PLAN DETAILS
Call 911 or return to the ED should pt develop suicidal thoughts. Call 911 or return to the ED should pt develop suicidal thoughts. He also agreed to inform his father . He declines any outpatient referral for treatment.

## 2019-06-14 NOTE — ED BEHAVIORAL HEALTH ASSESSMENT NOTE - SUMMARY
Patient is a 47 y/o male, single, domiciled with father, hx depression, anxiety, alcohol abuse (12-15 beers daily), prior psych admission [facility] 2018 for suicide attempt via hanging, not in current psych tx, non-compliant with antidepressants who was BIBEMS activated by bystander as pt had intention to stab himself with a knife. Patient reports that he feels overwhelmed with feeling  depressed for years, anhedonic and hopeless. Pt says that nearly every day he has thoughts of suicide thinking about either hanging himself, stabbing himself or [place]. Pt says that he had a self-aborted suicide attempt to stab himself with a knife but he did not go through and asked a  nearby.  Pt denies having current suicidal thoughts or intent and reports that he wants to go home because he does not feel that he will benefit from coming to the hospital and earlier he was intoxicated from etoh. Pt reports that he has not hurt himself and every time he has overwhelming thoughts he always calls for help. Pt reports that he does not want to die because he is afraid of going to hell. Pt reports that within the past 24 hours, he has consumed 18-19 beers but has sobered up and no longer feels the same way. Based on assessment, pt appears to be at an elevated risk for suicide mostly chronic now that pt is sober and denies having S/H/I/I/P. In addition he reports that he does not want any treatment. Pt was offered hospitalization but declined at this time. Pt's father agreed to take pt back at his residence. Patient is a 49 y/o male, single, domiciled with father, hx depression, anxiety, alcohol abuse (12-15 beers daily), [facility] for suicide attempt via hanging, not in current psych tx, non-compliant with antidepressants who was BIBEMS activated by bystander as pt had intention to stab himself with a knife. Patient reports that he feels overwhelmed with feeling  depressed for years, anhedonic and hopeless. Pt says that nearly every day he has thoughts of suicide thinking about either hanging himself, stabbing himself or jumping [place]. Pt says that he had a self-aborted suicide attempt to stab himself with a knife but he did not go through and asked a  nearby.  Pt denies having current suicidal thoughts or intent and reports that he wants to go home because he does not feel that he will benefit from coming to the hospital and earlier he was intoxicated from etoh. Pt reports that he has not hurt himself and every time he has overwhelming thoughts he always calls for help. Pt reports that he does not want to die because he is afraid of going to hell. Pt reports that within the past 24 hours, he has consumed 18-19 beers but has sobered up and no longer feels the same way. Based on assessment, pt appears to be at an moderate to high risk  for suicide due to mostly chronic now that pt is sober and denies having S/H/I/I/P. In addition he reports that he does not want any treatment. Pt was offered hospitalization but declined at this time. Pt's father agreed to take pt back at his residence.

## 2019-06-14 NOTE — ED PROVIDER NOTE - NSFOLLOWUPCLINICS_GEN_ALL_ED_FT
[facility]  Behavioral Health  [id number] [place] [address]  Phone: [phone]  Fax:   Follow Up Time:

## 2019-06-14 NOTE — ED ADULT NURSE NOTE - CAS DISCH TRANSFER METHOD
Cab company called multiple times to state different cab was out and that was never the case as pt was sitting outside with PES. Pt got frustrated and decided to walk/Walking

## 2019-06-14 NOTE — ED PROVIDER NOTE - CLINICAL SUMMARY MEDICAL DECISION MAKING FREE TEXT BOX
49 y/o M hx Depression     Labs, Urine Tox/UA, EKG    Medical evaluation performed. There is no clinical evidence of intoxication or any acute medical problem requiring immediate intervention. Patient is awaiting psychiatric consultation. Final disposition will be determined by psychiatrist.

## 2019-06-14 NOTE — ED BEHAVIORAL HEALTH ASSESSMENT NOTE - HPI (INCLUDE ILLNESS QUALITY, SEVERITY, DURATION, TIMING, CONTEXT, MODIFYING FACTORS, ASSOCIATED SIGNS AND SYMPTOMS)
Patient is a 47 y/o male, single, domiciled with father, hx depression, anxiety, alcohol abuse (12-15 beers daily), prior psych admission [facility] 2018 for suicide attempt via hanging, not in current psych tx, non-compliant with antidepressants who was BIBEMS activated by bystander as pt had intention to stab himself with a knife.     Patient reports that he feels overwhelmed with feeling  depressed for years, anhedonic and hopeless. Pt says that nearly every day he has thoughts of suicide thinking about either hanging himself, stabbing himself or jumping in front of a train [place] [employer] [employer]. Pt says that last night, he tied a belt around his neck but took it off. Pt says that today, he left the house on bad terms with his father with a knife. Pt says that he went to [facility] with the intention of stabbing himself in the woods. Pt says that when he got there, he took out the knife but he did not go through with stabbing himself. He says that he saw a  for a nearby apartment complex and told him to call 911 which is why he was brought here. Pt reports hopelessness reporting that he does not feel like he will get better. Pt reports that he abuses etoh and within the last 24 hours, he drank 18-19 beers. Pt currently denies having any current SI and insists that he wants to go home and he would not hurt himself. Pt reports that he does not want to die and every time he becomes overwhelmed, he calls for help. In addition, pt reports that he is afraid of dying because he does not want to go to hell and he was raised [place]. Pt also reports that he does not feel like hospitalization will help him because he knows that he will not commit to treatment and he will continue to drink etoh.  Pt denies psychotic symptoms. Patient is a 47 y/o male, single, domiciled with father, hx depression, anxiety, alcohol abuse (12-15 beers daily), prior psych admission [facility] 2018 for suicide attempt via hanging, brief treatment at [facility] , not in current psych tx, non-compliant with antidepressants who was BIBEMS activated by bystander as pt had intention to stab himself with a knife. As per the  pt threw the knife into the bushes as soon as he saw the  .     Patient was seen after he reached sober state. He came in with a  at 18:30. Patient reports that he feels overwhelmed with feeling  depressed for years, anhedonic and hopeless ever since he was a young boy. . Pt says that nearly every day he has thoughts of suicide thinking about either hanging himself, stabbing himself or jumping [place]. Pt says that last night, he tied a belt around his neck but took it off. Pt says that today, he left the house on bad terms with his father with a knife. Pt says that he went to [place] with the intention of stabbing himself in the woods. Pt says that when he got there, he took out the knife but he did not go through with stabbing himself. He says that he saw a  for a nearby apartment complex and told him to call 911 which is why he was brought here. Pt reports he has not gone through the suicide because he has glimpse of hope for the next day . He admits he has anhedonia but he states he had it all his life . He is endorsing global insomnia which is chronic and ETOH helps him with sleep .  Pt reports that he abuses etoh and within the last 24 hours, he drank 18-19 beers. Pt currently denies having any current SI and insists that he wants to go home and he would not hurt himself. Pt reports that he does not want to die and every time he becomes overwhelmed, he calls for help. In addition, pt reports that he is afraid of dying because he does not want to go to hell and he was raised Jew. Pt also reports that he does not feel like hospitalization will help him because he knows that he will not commit to treatment and he will continue to drink etoh.  Pt denies psychotic symptoms.

## 2019-06-14 NOTE — ED BEHAVIORAL HEALTH ASSESSMENT NOTE - CASE SUMMARY
Patient is a 47 y/o male, single, domiciled with father, hx depression, anxiety, alcohol abuse (12-15 beers daily), [facility] for suicide attempt via hanging, brief treatment at [facility] , not in current psych tx, non-compliant with psychiatric or drug treatment, recently seen in [facility] ED for similar presentation , who was BIBEMS activated by bystander as pt had intention to stab himself with a knife. As per the  pt threw the knife into the bushes as soon as he saw the  . Pt has an insight into his illness and ETOH abuse and admits he suffers from depression and chronic si and active ETOH for many years , however exercises poor judgement and states he is not ready nor wants any help with his ETOH issues and his psychiatric issues . He adamantly states he does not believe in medications and will not take any medications and does feel the hospitalization will help him. He openly reports that once he leaves the hospital he will go back to drinking and will no take any psychiatric medications. He states when he is at the state of acute danger to himself he actively seeks help each and every time , because he is afraid "what on the other side' and that he will end up in hell. While he has chronic si he says he does not follow through his si because he has glimpse of hope for tomorrow. During the interview (which is during sober state) pt denies any current /present si/i/p, and states "I just want to go home and go to bed and sleep". Pt currently does not presents an imminent danger to himself , free of si/i/p and his risk is reduced and chronic  when he is sober . The risk of self harm is high  when he is actively drinking and intoxicated. He has protective factors including he has a home , has a father that he takes care of, fear of going to hell. The risks will continue to be elevated unless both issues are addressed and treated  ETOH abuse and psychiatric  issues which are comorbid in this patient. Pt is offered but declines and at this time he is not meeting criteria for involuntary inpatient hospitalization.   I have spoken to pt's father , Arley , who informs me that pt had an argument with him this afternoon over the sister who pt expects to help out more. The father states this is ongoing issues with his son, and he has "double, split personality", one day he is good and the next day he is off the wall". The father states pt has not noticed any changes that would indicate pt is not at his baseline, reports pt has been helping around the house cooking dinner most of the nights, vacuuming , The father is not sure about the drinking but states pt maybe drinks few drinks a day but also states pt has days when he is not drinking. He does not express any acute safety concerns and open to having him return home tonight.

## 2019-06-14 NOTE — ED BEHAVIORAL HEALTH NOTE - BEHAVIORAL HEALTH NOTE
Writer called [facility], [phone], and spoke with Micki, to arrange [facility] service, as requested by the evaluating provider. She provided invoice # [id number], for service to be provided by [facility], [phone], to transport him to his address, verified to be [address] [place] [address]. Preetir called [facility], and was given an ETA of.  called [facility], [phone], and spoke with Micki, to arrange [facility] service, as requested by the evaluating provider. She provided invoice # [id number], for service to be provided by [facility], [phone], to transport him to his address, verified to be [address] [place] [address]. Writer called [facility], and was told no service was available. Preetir called [facility] back and spoke with Vj, who stated the new vendor will be [employer], [phone].  called there and was given an ETA of.  called [facility], [phone], and spoke with Micki, to arrange [facility] service, as requested by the evaluating provider. She provided invoice # [id number], for service to be provided by [facility], [phone], to transport him to his address, verified to be [address] [place] [address]. Writer called [facility], and was told no service was available. Preetir called [facility] back and spoke with Vj, who stated the new vendor will be [employer], [phone].  called there and was given an ETA of 12AM.

## 2019-06-27 NOTE — ED BEHAVIORAL HEALTH ASSESSMENT NOTE - MODE OF ARRIVAL
EMT / Paramedic
Increased latency

## 2019-09-30 ENCOUNTER — EMERGENCY (EMERGENCY)
Facility: HOSPITAL | Age: 49
LOS: 1 days | Discharge: HOME | End: 2019-09-30
Attending: EMERGENCY MEDICINE | Admitting: EMERGENCY MEDICINE
Payer: MEDICAID

## 2019-09-30 VITALS
RESPIRATION RATE: 18 BRPM | HEART RATE: 96 BPM | TEMPERATURE: 98 F | SYSTOLIC BLOOD PRESSURE: 119 MMHG | OXYGEN SATURATION: 100 % | DIASTOLIC BLOOD PRESSURE: 77 MMHG

## 2019-09-30 DIAGNOSIS — Z98.890 OTHER SPECIFIED POSTPROCEDURAL STATES: Chronic | ICD-10-CM

## 2019-09-30 PROBLEM — F32.9 MAJOR DEPRESSIVE DISORDER, SINGLE EPISODE, UNSPECIFIED: Chronic | Status: ACTIVE | Noted: 2019-07-14

## 2019-09-30 LAB
ALBUMIN SERPL ELPH-MCNC: 5 G/DL — SIGNIFICANT CHANGE UP (ref 3.3–5)
ALP SERPL-CCNC: 139 U/L — HIGH (ref 40–120)
ALT FLD-CCNC: 170 U/L — HIGH (ref 4–41)
AMPHET UR-MCNC: NEGATIVE — SIGNIFICANT CHANGE UP
ANION GAP SERPL CALC-SCNC: 24 MMO/L — HIGH (ref 7–14)
APAP SERPL-MCNC: < 15 UG/ML — LOW (ref 15–25)
APPEARANCE UR: CLEAR — SIGNIFICANT CHANGE UP
AST SERPL-CCNC: 212 U/L — HIGH (ref 4–40)
BARBITURATES UR SCN-MCNC: NEGATIVE — SIGNIFICANT CHANGE UP
BASE EXCESS BLDV CALC-SCNC: -3 MMOL/L — SIGNIFICANT CHANGE UP
BASOPHILS # BLD AUTO: 0.05 K/UL — SIGNIFICANT CHANGE UP (ref 0–0.2)
BASOPHILS NFR BLD AUTO: 0.8 % — SIGNIFICANT CHANGE UP (ref 0–2)
BENZODIAZ UR-MCNC: NEGATIVE — SIGNIFICANT CHANGE UP
BILIRUB SERPL-MCNC: 0.7 MG/DL — SIGNIFICANT CHANGE UP (ref 0.2–1.2)
BILIRUB UR-MCNC: NEGATIVE — SIGNIFICANT CHANGE UP
BLOOD GAS VENOUS - CREATININE: 0.49 MG/DL — LOW (ref 0.5–1.3)
BLOOD UR QL VISUAL: NEGATIVE — SIGNIFICANT CHANGE UP
BUN SERPL-MCNC: 5 MG/DL — LOW (ref 7–23)
CALCIUM SERPL-MCNC: 9.3 MG/DL — SIGNIFICANT CHANGE UP (ref 8.4–10.5)
CANNABINOIDS UR-MCNC: POSITIVE — SIGNIFICANT CHANGE UP
CHLORIDE BLDV-SCNC: 107 MMOL/L — SIGNIFICANT CHANGE UP (ref 96–108)
CHLORIDE SERPL-SCNC: 98 MMOL/L — SIGNIFICANT CHANGE UP (ref 98–107)
CK SERPL-CCNC: 139 U/L — SIGNIFICANT CHANGE UP (ref 30–200)
CO2 SERPL-SCNC: 20 MMOL/L — LOW (ref 22–31)
COCAINE METAB.OTHER UR-MCNC: NEGATIVE — SIGNIFICANT CHANGE UP
COLOR SPEC: SIGNIFICANT CHANGE UP
CREAT SERPL-MCNC: 0.59 MG/DL — SIGNIFICANT CHANGE UP (ref 0.5–1.3)
EOSINOPHIL # BLD AUTO: 0.11 K/UL — SIGNIFICANT CHANGE UP (ref 0–0.5)
EOSINOPHIL NFR BLD AUTO: 1.7 % — SIGNIFICANT CHANGE UP (ref 0–6)
ETHANOL BLD-MCNC: 231 MG/DL — HIGH
GAS PNL BLDV: 142 MMOL/L — SIGNIFICANT CHANGE UP (ref 136–146)
GLUCOSE BLDV-MCNC: 71 MG/DL — SIGNIFICANT CHANGE UP (ref 70–99)
GLUCOSE SERPL-MCNC: 91 MG/DL — SIGNIFICANT CHANGE UP (ref 70–99)
GLUCOSE UR-MCNC: NEGATIVE — SIGNIFICANT CHANGE UP
HCO3 BLDV-SCNC: 22 MMOL/L — SIGNIFICANT CHANGE UP (ref 20–27)
HCT VFR BLD CALC: 46 % — SIGNIFICANT CHANGE UP (ref 39–50)
HCT VFR BLDV CALC: 45.4 % — SIGNIFICANT CHANGE UP (ref 39–51)
HGB BLD-MCNC: 15.1 G/DL — SIGNIFICANT CHANGE UP (ref 13–17)
HGB BLDV-MCNC: 14.8 G/DL — SIGNIFICANT CHANGE UP (ref 13–17)
IMM GRANULOCYTES NFR BLD AUTO: 0.6 % — SIGNIFICANT CHANGE UP (ref 0–1.5)
KETONES UR-MCNC: SIGNIFICANT CHANGE UP
LACTATE BLDV-MCNC: 3.8 MMOL/L — HIGH (ref 0.5–2)
LEUKOCYTE ESTERASE UR-ACNC: NEGATIVE — SIGNIFICANT CHANGE UP
LYMPHOCYTES # BLD AUTO: 1.75 K/UL — SIGNIFICANT CHANGE UP (ref 1–3.3)
LYMPHOCYTES # BLD AUTO: 26.7 % — SIGNIFICANT CHANGE UP (ref 13–44)
MCHC RBC-ENTMCNC: 31.9 PG — SIGNIFICANT CHANGE UP (ref 27–34)
MCHC RBC-ENTMCNC: 32.8 % — SIGNIFICANT CHANGE UP (ref 32–36)
MCV RBC AUTO: 97 FL — SIGNIFICANT CHANGE UP (ref 80–100)
METHADONE UR-MCNC: NEGATIVE — SIGNIFICANT CHANGE UP
MONOCYTES # BLD AUTO: 0.47 K/UL — SIGNIFICANT CHANGE UP (ref 0–0.9)
MONOCYTES NFR BLD AUTO: 7.2 % — SIGNIFICANT CHANGE UP (ref 2–14)
NEUTROPHILS # BLD AUTO: 4.14 K/UL — SIGNIFICANT CHANGE UP (ref 1.8–7.4)
NEUTROPHILS NFR BLD AUTO: 63 % — SIGNIFICANT CHANGE UP (ref 43–77)
NITRITE UR-MCNC: NEGATIVE — SIGNIFICANT CHANGE UP
NRBC # FLD: 0 K/UL — SIGNIFICANT CHANGE UP (ref 0–0)
OPIATES UR-MCNC: NEGATIVE — SIGNIFICANT CHANGE UP
OXYCODONE UR-MCNC: NEGATIVE — SIGNIFICANT CHANGE UP
PCO2 BLDV: 38 MMHG — LOW (ref 41–51)
PCP UR-MCNC: NEGATIVE — SIGNIFICANT CHANGE UP
PH BLDV: 7.37 PH — SIGNIFICANT CHANGE UP (ref 7.32–7.43)
PH UR: 6 — SIGNIFICANT CHANGE UP (ref 5–8)
PLATELET # BLD AUTO: 113 K/UL — LOW (ref 150–400)
PMV BLD: 10.9 FL — SIGNIFICANT CHANGE UP (ref 7–13)
PO2 BLDV: 67 MMHG — HIGH (ref 35–40)
POTASSIUM BLDV-SCNC: 3.6 MMOL/L — SIGNIFICANT CHANGE UP (ref 3.4–4.5)
POTASSIUM SERPL-MCNC: 3.7 MMOL/L — SIGNIFICANT CHANGE UP (ref 3.5–5.3)
POTASSIUM SERPL-SCNC: 3.7 MMOL/L — SIGNIFICANT CHANGE UP (ref 3.5–5.3)
PROT SERPL-MCNC: 7.6 G/DL — SIGNIFICANT CHANGE UP (ref 6–8.3)
PROT UR-MCNC: NEGATIVE — SIGNIFICANT CHANGE UP
RBC # BLD: 4.74 M/UL — SIGNIFICANT CHANGE UP (ref 4.2–5.8)
RBC # FLD: 13.1 % — SIGNIFICANT CHANGE UP (ref 10.3–14.5)
SALICYLATES SERPL-MCNC: < 5 MG/DL — LOW (ref 15–30)
SAO2 % BLDV: 91.9 % — HIGH (ref 60–85)
SODIUM SERPL-SCNC: 142 MMOL/L — SIGNIFICANT CHANGE UP (ref 135–145)
SP GR SPEC: 1 — SIGNIFICANT CHANGE UP (ref 1–1.04)
TSH SERPL-MCNC: 1.27 UIU/ML — SIGNIFICANT CHANGE UP (ref 0.27–4.2)
UROBILINOGEN FLD QL: NORMAL — SIGNIFICANT CHANGE UP
WBC # BLD: 6.56 K/UL — SIGNIFICANT CHANGE UP (ref 3.8–10.5)
WBC # FLD AUTO: 6.56 K/UL — SIGNIFICANT CHANGE UP (ref 3.8–10.5)

## 2019-09-30 PROCEDURE — 99284 EMERGENCY DEPT VISIT MOD MDM: CPT

## 2019-09-30 PROCEDURE — 90792 PSYCH DIAG EVAL W/MED SRVCS: CPT

## 2019-09-30 RX ORDER — THIAMINE MONONITRATE (VIT B1) 100 MG
100 TABLET ORAL ONCE
Refills: 0 | Status: COMPLETED | OUTPATIENT
Start: 2019-09-30 | End: 2019-09-30

## 2019-09-30 RX ORDER — SODIUM CHLORIDE 9 MG/ML
2000 INJECTION INTRAMUSCULAR; INTRAVENOUS; SUBCUTANEOUS ONCE
Refills: 0 | Status: COMPLETED | OUTPATIENT
Start: 2019-09-30 | End: 2019-09-30

## 2019-09-30 RX ADMIN — Medication 100 MILLIGRAM(S): at 22:38

## 2019-09-30 RX ADMIN — SODIUM CHLORIDE 2000 MILLILITER(S): 9 INJECTION INTRAMUSCULAR; INTRAVENOUS; SUBCUTANEOUS at 22:48

## 2019-09-30 NOTE — ED BEHAVIORAL HEALTH ASSESSMENT NOTE - DESCRIPTION
Calm, cooperative  ICU Vital Signs Last 24 Hrs  T(C): 36.9 (30 Sep 2019 15:59), Max: 36.9 (30 Sep 2019 15:59)  T(F): 98.4 (30 Sep 2019 15:59), Max: 98.4 (30 Sep 2019 15:59)  HR: 96 (30 Sep 2019 15:59) (96 - 96)  BP: 119/77 (30 Sep 2019 15:59) (119/77 - 119/77)  BP(mean): --  ABP: --  ABP(mean): --  RR: 18 (30 Sep 2019 15:59) (18 - 18)  SpO2: 100% (30 Sep 2019 15:59) (100% - 100%) see above lives with father, used to work as

## 2019-09-30 NOTE — ED BEHAVIORAL HEALTH ASSESSMENT NOTE - ACTIVATING EVENTS/STRESSORS
Triggering events leading to humiliation, shame, and/or despair (e.g. Loss of relationship, financial or health status) (real or anticipated)/Pending incarceration or homelessness/Non-compliant or not receiving treatment/Substance intoxication or withdrawal

## 2019-09-30 NOTE — ED PROVIDER NOTE - NSFOLLOWUPINSTRUCTIONS_ED_ALL_ED_FT
Follow up with your medical doctor within 1 week  Follow up with psychiatrist within 1 week or can go to [facility] as walk in - see brochure during business hours weekdays.    You should stop drinking  Seek out Alcoholics Anonymous to help with this. Follow up with your medical doctor within 1 week  Follow up with psychiatrist within 1 week or can go to [facility] as walk in - see brochure during business hours weekdays.    You should stop drinking  Seek out Alcoholics Anonymous to help with this.  Take librium 25mg orally every 6 hours as needed for alcohol withdrawal symptoms like tremor.

## 2019-09-30 NOTE — ED BEHAVIORAL HEALTH ASSESSMENT NOTE - HPI (INCLUDE ILLNESS QUALITY, SEVERITY, DURATION, TIMING, CONTEXT, MODIFYING FACTORS, ASSOCIATED SIGNS AND SYMPTOMS)
Patient is a 48 y/o  male, single, domiciled with father, hx depression, anxiety, alcohol abuse (12-20 beers daily), prior psych admission to [facility] 2018 for suicide attempt via hanging, brief treatment at [facility], not in current psych tx, non-compliant with antidepressants who was brought in by EMS, activated by father, for agitation at home. Patient was tased twice by police on scene. BAL in ED on arrival was 231.    Patient evaluated once clinically sober. He reports that he was "highly agitated today". Patient says he "feels like I'm a slave to my dad". Patient reports that he is the primary caretaker for his father. He says that he cooks, cleans, does grocery shopping, takes him to doctors appointments, showers and changes him. Patient also states his worst fear is to be homeless and states his father "exploits me" by using this against him. Today they got into an argument about his sister. Patient states all their arguments usually involved his sister. He states that his sister refuses to help him care for his father, which creates more stress for him. He says his birthday was a couple of days ago and he decided that he can no longer live like this (meaning being the only caretaker for his father) or be taken advantage of, because he is at his "wit's end". Today he mandated to his father that his sister must take him to all his appointments and his father did not agree. Patient began to get upset and yell. He says that his father went to the phone and called EMS. When he asked his father if he called, his father denied it. Patient states he asked his father several times and says "he didn't have the decency to look me in the eye and just say it. I always told him that if he called the , I wouldn't go easy because it's my house too". Police came and patient was agitated, getting tased twice on scene before coming to ED. Patient report chronic suicidal ideation since grammar school. He states it is unchanged from its usual baseline. He says from time to time he throws out "scenarios, I wouldn't really call them plans" - such as banging his head into the wall. When asked what prevents him from harming himself, he says, "because I'm not fucking crazy, that's why".  He is endorsing global insomnia which is chronic and ETOH helps him with sleep - drinking between 12-20 beers per day. He denies withdrawal seizures but does report some tremors/nausea/diaphoresis if he does not drink. Pt currently denies having any current SI and insists that he would not hurt himself. In addition, pt reports that he is afraid of dying because he does not want to go to hel and he was raised Buddhism. Patient denies manic symptoms including elevated mood, increased irritability, mood lability, distractibility, grandiosity, pressured speech, increase in goal-directed activity, or decreased need for sleep. Patient denies any psychotic symptoms including paranoia, ideas of reference, thought insertion/broadcasting, or auditory/visual/olfactory/tactile/gustatory hallucinations. Denies any other substance abuse. Denies HI/I/P. Declined voluntary admission when offered, states he will accept  help as he does not want to return home.     Collateral obtained from , see  note.
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
## 2019-09-30 NOTE — ED BEHAVIORAL HEALTH ASSESSMENT NOTE - SUMMARY
Patient is a 48 y/o  male, single, domiciled with father, hx depression, anxiety, alcohol abuse (12-20 beers daily), prior psych admission to [facility] 2018 for suicide attempt via hanging, brief treatment at [facility], not in current psych tx, non-compliant with antidepressants who was brought in by EMS, activated by father, for agitation at home. Patient was tased twice by police on scene. BAL in ED on arrival was 231.    Patient evaluated when clinically sober. Patient denies SI/HI/I/P. Denies grisel and psychosis. Patient reports chronic suicidal ideation that is unchanged from baseline. Reports argument with his father tonight and does not want to return home. He was offered inpatient detox and refused. He was offered voluntary admission to inpatient psychiatry and refused. He does not meet criteria for involuntary admission. He will be discharged at this time and provided with shelter referrals as well as substance abuse outpatient programs and the [facility] crisis clinic (though he declines, will include in discharge paperwork).
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
## 2019-09-30 NOTE — ED PROVIDER NOTE - PATIENT PORTAL LINK FT
You can access the FollowMyHealth Patient Portal offered by [facility] by registering at the following website: [email or web address]. By joining [employer]’s FollowMyHealth portal, you will also be able to view your health information using other applications (apps) compatible with our system.

## 2019-09-30 NOTE — ED BEHAVIORAL HEALTH ASSESSMENT NOTE - RISK ASSESSMENT
Low Acute Suicide Risk moderate to high risk , but not imminent risk for self harm . Pt is an  active ETOH abuse which  he is not ready to address or get help his drinking issues as well as his mood  . He does not meet criteria for involuntary admission at this time  as he repeatedly denies any si/i/p when interviewed when  he is sober and provides protective factors including fear of going to hell and that he seeks help each time he is suicidal .  His risk is high/acute when he is  intoxicated, once he is sober the risk is chronic. He can benefit  from inpatient detox and drug treatment but declines both drug treatment and psychiatric treatment .  Unmodifiable risk factors : Single, male, hx of sa.   Risk factors  EToh abuse, multiple stressors,  absence of outpatient follow-up, medication non- compliance,  Protective factors: denies any active suicidal ideation/intent/plan, has responsibility to father, fear of death.    Based on risk assessment evaluation, Pt does not  appear to be at imminent risk of harm to self or others at this time. Pt's chronic risk elevated.
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
## 2019-09-30 NOTE — ED PROVIDER NOTE - OBJECTIVE STATEMENT
This is a 49 yr old m, pmh depression and etoh abuse. [employer] and ems. His elderly father called 911 due to pt became agitative and aggressive. Upon arrival pt oppositional, argumentative, but after removing handcuff became more cooperative This is a 49 yr old m, pmh depression and etoh abuse with c/o aggression, agitation, acting out behaviour. [employer] and ems. His elderly father called 911 due to pt became agitative and aggressive. Upon arrival pt oppositional, argumentative, but after removing handcuff became more cooperative. As per PD pt was teased at the scene due to his aggressive behaviour towards officers. Pt was found with 1 teaser pong on the clothing but did not  pierced the skin. Pt reports he became agitative at home and pushed and throw furniture. States he told his father " I am only 1 person I can not do it all". Endorses previous hospitalization to [facility] and reports intermittent SI with a plan with hanging himself. He states in the passed he tried before.

## 2019-09-30 NOTE — ED BEHAVIORAL HEALTH ASSESSMENT NOTE - SAFETY PLAN ADDT'L DETAILS
Education provided regarding environmental safety / lethal means restriction/Provision of National Suicide Prevention Lifeline 3-462-026-DXMR (4611)/Safety plan discussed with...

## 2019-09-30 NOTE — ED BEHAVIORAL HEALTH ASSESSMENT NOTE - DETAILS
pt reports daily SI and plan but currently denies. few years ago admitted to ICU for withdrawal mother depression father

## 2019-09-30 NOTE — ED BEHAVIORAL HEALTH ASSESSMENT NOTE - SAFETY PLAN DETAILS
Call 911 or return to the ED should pt develop suicidal thoughts. He declines any outpatient referral for treatment.

## 2019-09-30 NOTE — ED PROVIDER NOTE - CLINICAL SUMMARY MEDICAL DECISION MAKING FREE TEXT BOX
This is a 49 yr old m, pmh depression and etoh abuse with c/o aggression, agitation, acting out behaviour.   Blood work, ua toxicology serum, r/o underlying medical causes.   Psych consult requested.

## 2019-09-30 NOTE — ED BEHAVIORAL HEALTH ASSESSMENT NOTE - SUICIDE PROTECTIVE FACTORS
Has future plans/Cultural, spiritual and/or moral attitudes against suicide/Episcopal beliefs/Supportive social network of family or friends/Fear of death or the actual act of killing self

## 2019-10-01 VITALS
RESPIRATION RATE: 16 BRPM | TEMPERATURE: 98 F | HEART RATE: 98 BPM | SYSTOLIC BLOOD PRESSURE: 100 MMHG | DIASTOLIC BLOOD PRESSURE: 60 MMHG | OXYGEN SATURATION: 98 %

## 2019-10-01 LAB — LACTATE BLDV-MCNC: 3 MMOL/L — HIGH (ref 0.5–2)

## 2020-09-22 NOTE — ED PROVIDER NOTE - NS ED ATTENDING STATEMENT MOD
Duration Of Freeze Thaw-Cycle (Seconds): 20 Detail Level: Detailed Render Note In Bullet Format When Appropriate: No Consent: The patient's consent was obtained including but not limited to risks of crusting, scabbing, blistering, scarring, darker or lighter pigmentary change, recurrence, incomplete removal and infection. Post-Care Instructions: I reviewed with the patient in detail post-care instructions. Patient is to wear sunprotection, and avoid picking at any of the treated lesions. Pt may apply Vaseline to crusted or scabbing areas. Number Of Freeze-Thaw Cycles: 1 freeze-thaw cycle Render Post-Care Instructions In Note?: yes I have personally seen and examined this patient.  I have fully participated in the care of this patient. I have reviewed all pertinent clinical information, including history, physical exam, plan and the Resident’s note and agree except as noted.

## 2021-04-28 NOTE — H&P ADULT - NSHPPOADEEPVENOUSTHROMB_GEN_A_CORE
no Consent (Near Eyelid Margin)/Introductory Paragraph: The rationale for Mohs was explained to the patient and consent was obtained. The risks, benefits and alternatives to therapy were discussed in detail. Specifically, the risks of ectropion or eyelid deformity, infection, scarring, bleeding, prolonged wound healing, incomplete removal, allergy to anesthesia, nerve injury and recurrence were addressed. Prior to the procedure, the treatment site was clearly identified and confirmed by the patient. All components of Universal Protocol/PAUSE Rule completed.

## 2021-10-20 ENCOUNTER — EMERGENCY (EMERGENCY)
Facility: HOSPITAL | Age: 51
LOS: 1 days | Discharge: HOME | End: 2021-10-20
Attending: EMERGENCY MEDICINE | Admitting: EMERGENCY MEDICINE
Payer: MEDICAID

## 2021-10-20 VITALS
SYSTOLIC BLOOD PRESSURE: 132 MMHG | RESPIRATION RATE: 16 BRPM | TEMPERATURE: 97 F | DIASTOLIC BLOOD PRESSURE: 86 MMHG | HEIGHT: 68 IN | HEART RATE: 106 BPM | OXYGEN SATURATION: 100 %

## 2021-10-20 VITALS
HEART RATE: 99 BPM | DIASTOLIC BLOOD PRESSURE: 90 MMHG | RESPIRATION RATE: 16 BRPM | SYSTOLIC BLOOD PRESSURE: 136 MMHG | TEMPERATURE: 97 F | OXYGEN SATURATION: 99 %

## 2021-10-20 DIAGNOSIS — Z98.890 OTHER SPECIFIED POSTPROCEDURAL STATES: Chronic | ICD-10-CM

## 2021-10-20 LAB
ALBUMIN SERPL ELPH-MCNC: 4.8 G/DL — SIGNIFICANT CHANGE UP (ref 3.3–5)
ALP SERPL-CCNC: 130 U/L — HIGH (ref 40–120)
ALT FLD-CCNC: 39 U/L — SIGNIFICANT CHANGE UP (ref 4–41)
ANION GAP SERPL CALC-SCNC: 18 MMOL/L — HIGH (ref 7–14)
APTT BLD: 38.2 SEC — HIGH (ref 27–36.3)
AST SERPL-CCNC: 109 U/L — HIGH (ref 4–40)
BASOPHILS # BLD AUTO: 0 K/UL — SIGNIFICANT CHANGE UP (ref 0–0.2)
BASOPHILS NFR BLD AUTO: 0 % — SIGNIFICANT CHANGE UP (ref 0–2)
BILIRUB SERPL-MCNC: 1.3 MG/DL — HIGH (ref 0.2–1.2)
BUN SERPL-MCNC: <2 MG/DL — LOW (ref 7–23)
CALCIUM SERPL-MCNC: 9.1 MG/DL — SIGNIFICANT CHANGE UP (ref 8.4–10.5)
CHLORIDE SERPL-SCNC: 100 MMOL/L — SIGNIFICANT CHANGE UP (ref 98–107)
CO2 SERPL-SCNC: 22 MMOL/L — SIGNIFICANT CHANGE UP (ref 22–31)
CREAT SERPL-MCNC: 0.44 MG/DL — LOW (ref 0.5–1.3)
EOSINOPHIL # BLD AUTO: 0.07 K/UL — SIGNIFICANT CHANGE UP (ref 0–0.5)
EOSINOPHIL NFR BLD AUTO: 0.9 % — SIGNIFICANT CHANGE UP (ref 0–6)
GLUCOSE SERPL-MCNC: 112 MG/DL — HIGH (ref 70–99)
HCT VFR BLD CALC: 38.2 % — LOW (ref 39–50)
HGB BLD-MCNC: 13.1 G/DL — SIGNIFICANT CHANGE UP (ref 13–17)
IANC: 4.46 K/UL — SIGNIFICANT CHANGE UP (ref 1.5–8.5)
INR BLD: 1.43 RATIO — HIGH (ref 0.88–1.16)
LYMPHOCYTES # BLD AUTO: 1.53 K/UL — SIGNIFICANT CHANGE UP (ref 1–3.3)
LYMPHOCYTES # BLD AUTO: 18.9 % — SIGNIFICANT CHANGE UP (ref 13–44)
MCHC RBC-ENTMCNC: 32.4 PG — SIGNIFICANT CHANGE UP (ref 27–34)
MCHC RBC-ENTMCNC: 34.3 GM/DL — SIGNIFICANT CHANGE UP (ref 32–36)
MCV RBC AUTO: 94.6 FL — SIGNIFICANT CHANGE UP (ref 80–100)
MONOCYTES # BLD AUTO: 0.15 K/UL — SIGNIFICANT CHANGE UP (ref 0–0.9)
MONOCYTES NFR BLD AUTO: 1.8 % — LOW (ref 2–14)
NEUTROPHILS # BLD AUTO: 5.77 K/UL — SIGNIFICANT CHANGE UP (ref 1.8–7.4)
NEUTROPHILS NFR BLD AUTO: 71.2 % — SIGNIFICANT CHANGE UP (ref 43–77)
NT-PROBNP SERPL-SCNC: 43 PG/ML — SIGNIFICANT CHANGE UP
PLATELET # BLD AUTO: 83 K/UL — LOW (ref 150–400)
POTASSIUM SERPL-MCNC: 3.9 MMOL/L — SIGNIFICANT CHANGE UP (ref 3.5–5.3)
POTASSIUM SERPL-SCNC: 3.9 MMOL/L — SIGNIFICANT CHANGE UP (ref 3.5–5.3)
PROT SERPL-MCNC: 7.2 G/DL — SIGNIFICANT CHANGE UP (ref 6–8.3)
PROTHROM AB SERPL-ACNC: 16.2 SEC — HIGH (ref 10.6–13.6)
RBC # BLD: 4.04 M/UL — LOW (ref 4.2–5.8)
RBC # FLD: 15.5 % — HIGH (ref 10.3–14.5)
SODIUM SERPL-SCNC: 140 MMOL/L — SIGNIFICANT CHANGE UP (ref 135–145)
WBC # BLD: 8.11 K/UL — SIGNIFICANT CHANGE UP (ref 3.8–10.5)
WBC # FLD AUTO: 8.11 K/UL — SIGNIFICANT CHANGE UP (ref 3.8–10.5)

## 2021-10-20 PROCEDURE — 99284 EMERGENCY DEPT VISIT MOD MDM: CPT | Mod: 25

## 2021-10-20 PROCEDURE — 93010 ELECTROCARDIOGRAM REPORT: CPT

## 2021-10-20 PROCEDURE — 71045 X-RAY EXAM CHEST 1 VIEW: CPT | Mod: 26

## 2021-10-20 NOTE — ED PROVIDER NOTE - PATIENT PORTAL LINK FT
You can access the FollowMyHealth Patient Portal offered by [facility] by registering at the following website: [email or web address]. By joining [employer]’s FollowMyHealth portal, you will also be able to view your health information using other applications (apps) compatible with our system.

## 2021-10-20 NOTE — ED PROVIDER NOTE - OBJECTIVE STATEMENT
51 hx depression, HLD (non compliant) 51 hx depression, HLD (non-compliant) p/w b/l LE swelling. Pt endorsing several days of b/l lower shin, foot/ankle swelling. States swelling is painless. Denies any recent medication use. Daily marijuana smoker and drinks "about a 12 pack a day. No fever, n/v/d/c, chest / abd pain, cough, sob, dizziness, dysuria/hematuria. No decrease in UOP.  No numbness/tingling of LE.

## 2021-10-20 NOTE — ED PROVIDER NOTE - ATTENDING CONTRIBUTION TO CARE
I performed a face-to-face evaluation of the patient and performed a history and physical examination along with the resident or ACP, and/or medical student above.  I agree with the history and physical examination as documented by the resident or ACP, and/or medical student above.  Jackson:   50yo M w/ pmh as above p/w b/l LE edema x few days, painless. Denies f/c/n/v/d/cp/sob/urinary complaints. H&P most consistent w/ venous statis. NO PE risk factors. Labs, ecg, cxr, outpt f/u.

## 2021-10-20 NOTE — ED ADULT NURSE NOTE - OBJECTIVE STATEMENT
51 year old male presents A&Ox4 complaining of bilateral lower extremity swelling x3 days. Pt states he has been running for years, but has never had these symptoms. Swelling noted on bilateral lower extremities with purplish rash. Pt denies having these symptoms in the past, chest pain, dyspnea, dizziness or hx of blood clots. Pt admits to drinking a few beers today but does not appear to be intoxicated. Respirations even and unlabored, speaking in full sentences without any difficulty, no accessory muscle use, pulse motor and sensation equal and present in all 4 extremities. Pt denies any pain at this time. DM at bedside performing assessment. bed in lowest position, side rails up, call bell in hand, safety maintained. awaiting further orders. will continue to monitor.

## 2021-10-20 NOTE — ED PROVIDER NOTE - NSICDXFAMILYHX_GEN_ALL_CORE_FT
FAMILY HISTORY:  Family history of depression, mother    Sibling  Still living? Unknown  Family history of alcohol abuse, Age at diagnosis: Age Unknown

## 2021-10-20 NOTE — ED PROVIDER NOTE - NSFOLLOWUPINSTRUCTIONS_ED_ALL_ED_FT
Please follow up with your primary care provider for further concerns you may have regarding your general health. Attached you will find your results from today's visit. Continue taking your medications as prescribed and keep your upcoming medical appointments.    Please follow up with the primary care doctor you are contacted for and limit your drinking.

## 2021-10-20 NOTE — ED PROVIDER NOTE - PROGRESS NOTE DETAILS
Nivia PGY3: INR 1.43, AST 3x ALT, likely c/w liver dx 2/2 longstanding etoh abuse. Discussed results w/ patient. Also discussed stopping scratching his legs as it was causing excoriations. Pt to wear compression stocks to improve CVS, will be referred for a pcp as he does not currently have one. Pt was re-evaluated at bedside, VSS, feeling better overall. Results were discussed with patient as well as return precautions and follow up plan with PCP and/or specialist. Time was taken to answer any questions that the patient had before providing them with discharge paperwork.

## 2021-10-20 NOTE — ED PROVIDER NOTE - CLINICAL SUMMARY MEDICAL DECISION MAKING FREE TEXT BOX
51M hx as above p/w b/l LE swelling over past several days w/o associated SOB, CP. B/l feet likely c/w chronic venous stasis, low suspicion for DVT, given b/l presentation. Extremities warm and perfused. Given hx daily etoh drinking will send basic labwork w/ coags, BNP, plan for CXR, reass. 51M hx as above p/w b/l LE swelling over past several days w/o associated SOB, CP. B/l feet likely c/w chronic venous stasis, low suspicion for DVT, given b/l presentation. However given drinking habits concern for sequelae 2/2 progressive liver dx.  Extremities warm and perfused. Given hx daily etoh drinking will send basic labwork w/ coags, BNP, plan for CXR, reass.

## 2021-10-20 NOTE — ED ADULT TRIAGE NOTE - CHIEF COMPLAINT QUOTE
c/o b/l leg swelling x3 days. Denies chest pain, sob, injury/trauma, numbness/tingling, blood thinners, drug use. PMHx etoh use (no hx of withdrawal), heart murmur

## 2021-10-20 NOTE — ED PROVIDER NOTE - PHYSICAL EXAMINATION
Gen: WDWN, NAD, tachy to 102  HEENT: EOMI, no nasal discharge, mucous membranes moist  CV: RRR, +S1/S2, no M/R/G  Resp: CTAB, + soft systolic murmur  GI: Abdomen soft non-distended, NTTP, no masses  MSK: + b/l LE 2+ pitting edema extending from feet through low shins w/ skin findings c/w CVS   Neuro: A&Ox4, following commands, moving all four extremities spontaneously  Psych: appropriate mood, affect, denies si/hi  Skin: nonblanching purporic rashes on feet w/ associated nonblanching petechial rash along shins, b/l LE warm, good cap refill

## 2022-01-01 NOTE — ED BEHAVIORAL HEALTH ASSESSMENT NOTE - EYE CONTACT
CLARISSA MONTANO; First Name: Juliocesar     GA 25.4 weeks;     Age: 31 d;   PMA: 30   BW:  850   MRN: [id number]    COURSE: 25 week GA, RDS, immature thermoregulation, anemia, PDA, apnea of prematurity, dysmotility, blocked tear duct  Completed: neutropenia,  presumed sepsis, hypotension,     INTERVAL EVENTS: Mild abdominal distention    Weight (g): 1130 + 50  Intake (ml/kg/day): 150  Urine output (ml/kg/hr or frequency): 2.9               Stools (frequency): x 4  Other: incubator - 28    Growth:  3/30  HC (cm): 23   3%ile     Length (cm): 35  25%ile; Kayce weight % 26 ; ADWG (g/day)  12  *******************************************************  Respiratory: RDS. YANA x 1.  Comfortable on bCPAP +5 0.21. FiO2 to keep SpO2 88-95%.  Caffeine for apnea of prematurity.   CV:  s/p hypotension: s/p  dopamine 3/11, now with improved BPs;  PDA: Ibuprofen (3/10-3/12 at 9 PM ); 3/10 Echo: Large, non restrictive PDA L->R PDA, mildly dilated L atrium. 3/14 Small PDA. 3/20 murmur appreciated on exam. ECHO 3/21: PFO with L-->r shunt, large non restrictive PDA with continuous left to right shunt. Holodiastolic reversal of flow in descending aorta. 3/22 -3/24 s/p 2nd course of Ibuprofen. ECHO 3/29 - Moderate PDA, mildly dilated LA, borderline dilated left ventricle. Reviewed with TCPC team - PDA is restrictive. TCPC not indicated at present.  Hem:  Hyperbilirubinemia due to prematurity. NOE positive; s/p phototherapy (3/7-3/9; 3/11-3/12)     Anemia - monitor per transfusion guidelines-  s/p PRBC tx  3/8, 3/13 At risk for thrombocytopenia.  3/22 Hct 29.6  FEN:   FEHM/[id number]  21 ml OG q3H over 60 minutes and MCT 1 ml q12H (....160 +17 from MCT). Glycerin daily for dysmotility.     ACCESS: PICC 3/14-3/20.  UAC  D/C 3/13  UV D/C 3/14.  ID: Monitor for signs and symptoms of sepsis. S/P  48 hour antibiotics.  Maternal placental culture pending.     Neuro: At risk for IVH/PVL. Serial HUS with no IVH ( 3/14). HUS 3/21: no IVH. Repeat at 1 month () - No IVH, no PVL.. NDE PTD.   Ophtho: At risk for ROP. Screening at 31 weeks of PMA.  Eye drainage without conjunctivitis attributable to blocked tear duct - resolved.   Screen: Repeated due to AA abnormality  Thermal: Immature thermoregulation, requires incubator.   Meds: caffeine, glycerin, Polyvisol  Social: Parents updated at bedside 3/18 (KRISTEN)  PLAN: Continue bCPAP  +5. Advance feeds as tolerated. Monitor PD - consider repeat echo.   Labs/Images/Studies:     This patient requires ICU care including continuous monitoring and frequent vital sign assessment due to significant risk of cardiorespiratory compromise or decompensation outside of the NICU.     Good

## 2022-01-19 NOTE — ED PROVIDER NOTE - ENMT, MLM
reviewed  PCP filling Xanax 60 tablets    Not an early fill.  Recent visit with PCP  Since PCP is out on Maternity, I will fill    
Airway patent, Nasal mucosa clear. Mouth with normal mucosa. Throat has no vesicles, no oropharyngeal exudates and uvula is midline.

## 2022-05-04 ENCOUNTER — RESULT REVIEW (OUTPATIENT)
Age: 52
End: 2022-05-04

## 2022-05-04 ENCOUNTER — INPATIENT (INPATIENT)
Facility: HOSPITAL | Age: 52
LOS: 0 days | Discharge: LEFT BEFORE DISCHARGE | End: 2022-05-05
Attending: HOSPITALIST | Admitting: HOSPITALIST
Payer: MEDICAID

## 2022-05-04 VITALS
RESPIRATION RATE: 16 BRPM | HEART RATE: 105 BPM | SYSTOLIC BLOOD PRESSURE: 144 MMHG | DIASTOLIC BLOOD PRESSURE: 79 MMHG | OXYGEN SATURATION: 100 % | TEMPERATURE: 100 F | HEIGHT: 68 IN

## 2022-05-04 DIAGNOSIS — D75.89 OTHER SPECIFIED DISEASES OF BLOOD AND BLOOD-FORMING ORGANS: ICD-10-CM

## 2022-05-04 DIAGNOSIS — Z98.890 OTHER SPECIFIED POSTPROCEDURAL STATES: Chronic | ICD-10-CM

## 2022-05-04 DIAGNOSIS — R74.01 ELEVATION OF LEVELS OF LIVER TRANSAMINASE LEVELS: ICD-10-CM

## 2022-05-04 DIAGNOSIS — Z87.898 PERSONAL HISTORY OF OTHER SPECIFIED CONDITIONS: ICD-10-CM

## 2022-05-04 DIAGNOSIS — R21 RASH AND OTHER NONSPECIFIC SKIN ERUPTION: ICD-10-CM

## 2022-05-04 DIAGNOSIS — R79.1 ABNORMAL COAGULATION PROFILE: ICD-10-CM

## 2022-05-04 DIAGNOSIS — A41.9 SEPSIS, UNSPECIFIED ORGANISM: ICD-10-CM

## 2022-05-04 DIAGNOSIS — Z87.828 PERSONAL HISTORY OF OTHER (HEALED) PHYSICAL INJURY AND TRAUMA: Chronic | ICD-10-CM

## 2022-05-04 DIAGNOSIS — L30.9 DERMATITIS, UNSPECIFIED: ICD-10-CM

## 2022-05-04 DIAGNOSIS — Z02.9 ENCOUNTER FOR ADMINISTRATIVE EXAMINATIONS, UNSPECIFIED: ICD-10-CM

## 2022-05-04 DIAGNOSIS — Z29.9 ENCOUNTER FOR PROPHYLACTIC MEASURES, UNSPECIFIED: ICD-10-CM

## 2022-05-04 DIAGNOSIS — F32.9 MAJOR DEPRESSIVE DISORDER, SINGLE EPISODE, UNSPECIFIED: ICD-10-CM

## 2022-05-04 LAB
ALBUMIN SERPL ELPH-MCNC: 3.9 G/DL — SIGNIFICANT CHANGE UP (ref 3.3–5)
ALP SERPL-CCNC: 102 U/L — SIGNIFICANT CHANGE UP (ref 40–120)
ALT FLD-CCNC: 20 U/L — SIGNIFICANT CHANGE UP (ref 4–41)
ANION GAP SERPL CALC-SCNC: 13 MMOL/L — SIGNIFICANT CHANGE UP (ref 7–14)
APTT BLD: 33.6 SEC — SIGNIFICANT CHANGE UP (ref 27–36.3)
AST SERPL-CCNC: 42 U/L — HIGH (ref 4–40)
B PERT DNA SPEC QL NAA+PROBE: SIGNIFICANT CHANGE UP
B PERT+PARAPERT DNA PNL SPEC NAA+PROBE: SIGNIFICANT CHANGE UP
BASE EXCESS BLDV CALC-SCNC: -1.1 MMOL/L — SIGNIFICANT CHANGE UP (ref -2–3)
BASOPHILS # BLD AUTO: 0.07 K/UL — SIGNIFICANT CHANGE UP (ref 0–0.2)
BASOPHILS NFR BLD AUTO: 0.7 % — SIGNIFICANT CHANGE UP (ref 0–2)
BILIRUB SERPL-MCNC: 1.1 MG/DL — SIGNIFICANT CHANGE UP (ref 0.2–1.2)
BLOOD GAS VENOUS COMPREHENSIVE RESULT: SIGNIFICANT CHANGE UP
BORDETELLA PARAPERTUSSIS (RAPRVP): SIGNIFICANT CHANGE UP
BUN SERPL-MCNC: 6 MG/DL — LOW (ref 7–23)
C PNEUM DNA SPEC QL NAA+PROBE: SIGNIFICANT CHANGE UP
CALCIUM SERPL-MCNC: 8.5 MG/DL — SIGNIFICANT CHANGE UP (ref 8.4–10.5)
CHLORIDE BLDV-SCNC: 102 MMOL/L — SIGNIFICANT CHANGE UP (ref 96–108)
CHLORIDE SERPL-SCNC: 103 MMOL/L — SIGNIFICANT CHANGE UP (ref 98–107)
CO2 BLDV-SCNC: 24.7 MMOL/L — SIGNIFICANT CHANGE UP (ref 22–26)
CO2 SERPL-SCNC: 22 MMOL/L — SIGNIFICANT CHANGE UP (ref 22–31)
CREAT SERPL-MCNC: 0.49 MG/DL — LOW (ref 0.5–1.3)
EGFR: 124 ML/MIN/1.73M2 — SIGNIFICANT CHANGE UP
EOSINOPHIL # BLD AUTO: 0.42 K/UL — SIGNIFICANT CHANGE UP (ref 0–0.5)
EOSINOPHIL NFR BLD AUTO: 4.1 % — SIGNIFICANT CHANGE UP (ref 0–6)
FLUAV SUBTYP SPEC NAA+PROBE: SIGNIFICANT CHANGE UP
FLUBV RNA SPEC QL NAA+PROBE: SIGNIFICANT CHANGE UP
GAS PNL BLDV: 134 MMOL/L — LOW (ref 136–145)
GLUCOSE BLDV-MCNC: 101 MG/DL — HIGH (ref 70–99)
GLUCOSE SERPL-MCNC: 104 MG/DL — HIGH (ref 70–99)
HADV DNA SPEC QL NAA+PROBE: SIGNIFICANT CHANGE UP
HCO3 BLDV-SCNC: 24 MMOL/L — SIGNIFICANT CHANGE UP (ref 22–29)
HCOV 229E RNA SPEC QL NAA+PROBE: SIGNIFICANT CHANGE UP
HCOV HKU1 RNA SPEC QL NAA+PROBE: SIGNIFICANT CHANGE UP
HCOV NL63 RNA SPEC QL NAA+PROBE: SIGNIFICANT CHANGE UP
HCOV OC43 RNA SPEC QL NAA+PROBE: SIGNIFICANT CHANGE UP
HCT VFR BLD CALC: 38.2 % — LOW (ref 39–50)
HCT VFR BLDA CALC: 40 % — SIGNIFICANT CHANGE UP (ref 39–51)
HGB BLD CALC-MCNC: 13.4 G/DL — SIGNIFICANT CHANGE UP (ref 13–17)
HGB BLD-MCNC: 12.7 G/DL — LOW (ref 13–17)
HMPV RNA SPEC QL NAA+PROBE: SIGNIFICANT CHANGE UP
HPIV1 RNA SPEC QL NAA+PROBE: SIGNIFICANT CHANGE UP
HPIV2 RNA SPEC QL NAA+PROBE: SIGNIFICANT CHANGE UP
HPIV3 RNA SPEC QL NAA+PROBE: SIGNIFICANT CHANGE UP
HPIV4 RNA SPEC QL NAA+PROBE: SIGNIFICANT CHANGE UP
IANC: 8 K/UL — HIGH (ref 1.8–7.4)
IMM GRANULOCYTES NFR BLD AUTO: 0.4 % — SIGNIFICANT CHANGE UP (ref 0–1.5)
INR BLD: 1.63 RATIO — HIGH (ref 0.88–1.16)
LACTATE BLDV-MCNC: 2.3 MMOL/L — HIGH (ref 0.5–2)
LYMPHOCYTES # BLD AUTO: 0.97 K/UL — LOW (ref 1–3.3)
LYMPHOCYTES # BLD AUTO: 9.5 % — LOW (ref 13–44)
M PNEUMO DNA SPEC QL NAA+PROBE: SIGNIFICANT CHANGE UP
MCHC RBC-ENTMCNC: 31.2 PG — SIGNIFICANT CHANGE UP (ref 27–34)
MCHC RBC-ENTMCNC: 33.2 GM/DL — SIGNIFICANT CHANGE UP (ref 32–36)
MCV RBC AUTO: 93.9 FL — SIGNIFICANT CHANGE UP (ref 80–100)
MONOCYTES # BLD AUTO: 0.75 K/UL — SIGNIFICANT CHANGE UP (ref 0–0.9)
MONOCYTES NFR BLD AUTO: 7.3 % — SIGNIFICANT CHANGE UP (ref 2–14)
NEUTROPHILS # BLD AUTO: 8 K/UL — HIGH (ref 1.8–7.4)
NEUTROPHILS NFR BLD AUTO: 78 % — HIGH (ref 43–77)
NRBC # BLD: 0 /100 WBCS — SIGNIFICANT CHANGE UP
NRBC # FLD: 0 K/UL — SIGNIFICANT CHANGE UP
PCO2 BLDV: 38 MMHG — LOW (ref 42–55)
PH BLDV: 7.4 — SIGNIFICANT CHANGE UP (ref 7.32–7.43)
PLATELET # BLD AUTO: 138 K/UL — LOW (ref 150–400)
PO2 BLDV: 40 MMHG — SIGNIFICANT CHANGE UP
POTASSIUM BLDV-SCNC: 3.7 MMOL/L — SIGNIFICANT CHANGE UP (ref 3.5–5.1)
POTASSIUM SERPL-MCNC: 3.8 MMOL/L — SIGNIFICANT CHANGE UP (ref 3.5–5.3)
POTASSIUM SERPL-SCNC: 3.8 MMOL/L — SIGNIFICANT CHANGE UP (ref 3.5–5.3)
PROT SERPL-MCNC: 6.4 G/DL — SIGNIFICANT CHANGE UP (ref 6–8.3)
PROTHROM AB SERPL-ACNC: 19 SEC — HIGH (ref 10.5–13.4)
RAPID RVP RESULT: SIGNIFICANT CHANGE UP
RBC # BLD: 4.07 M/UL — LOW (ref 4.2–5.8)
RBC # FLD: 16.4 % — HIGH (ref 10.3–14.5)
RSV RNA SPEC QL NAA+PROBE: SIGNIFICANT CHANGE UP
RV+EV RNA SPEC QL NAA+PROBE: SIGNIFICANT CHANGE UP
SAO2 % BLDV: 62.7 % — SIGNIFICANT CHANGE UP
SARS-COV-2 RNA SPEC QL NAA+PROBE: SIGNIFICANT CHANGE UP
SODIUM SERPL-SCNC: 138 MMOL/L — SIGNIFICANT CHANGE UP (ref 135–145)
WBC # BLD: 10.25 K/UL — SIGNIFICANT CHANGE UP (ref 3.8–10.5)
WBC # FLD AUTO: 10.25 K/UL — SIGNIFICANT CHANGE UP (ref 3.8–10.5)

## 2022-05-04 PROCEDURE — 73620 X-RAY EXAM OF FOOT: CPT | Mod: 26,50

## 2022-05-04 PROCEDURE — 73590 X-RAY EXAM OF LOWER LEG: CPT | Mod: 26,50

## 2022-05-04 PROCEDURE — 71045 X-RAY EXAM CHEST 1 VIEW: CPT | Mod: 26

## 2022-05-04 PROCEDURE — 88312 SPECIAL STAINS GROUP 1: CPT | Mod: 26

## 2022-05-04 PROCEDURE — 88305 TISSUE EXAM BY PATHOLOGIST: CPT | Mod: 26

## 2022-05-04 PROCEDURE — 99223 1ST HOSP IP/OBS HIGH 75: CPT | Mod: GC

## 2022-05-04 PROCEDURE — 99285 EMERGENCY DEPT VISIT HI MDM: CPT

## 2022-05-04 RX ORDER — VANCOMYCIN HCL 1 G
1000 VIAL (EA) INTRAVENOUS EVERY 12 HOURS
Refills: 0 | Status: DISCONTINUED | OUTPATIENT
Start: 2022-05-04 | End: 2022-05-05

## 2022-05-04 RX ORDER — HEPARIN SODIUM 5000 [USP'U]/ML
5000 INJECTION INTRAVENOUS; SUBCUTANEOUS EVERY 8 HOURS
Refills: 0 | Status: DISCONTINUED | OUTPATIENT
Start: 2022-05-04 | End: 2022-05-05

## 2022-05-04 RX ORDER — MEROPENEM 1 G/30ML
2000 INJECTION INTRAVENOUS ONCE
Refills: 0 | Status: DISCONTINUED | OUTPATIENT
Start: 2022-05-04 | End: 2022-05-04

## 2022-05-04 RX ORDER — SODIUM CHLORIDE 9 MG/ML
1000 INJECTION, SOLUTION INTRAVENOUS
Refills: 0 | Status: DISCONTINUED | OUTPATIENT
Start: 2022-05-04 | End: 2022-05-05

## 2022-05-04 RX ORDER — VANCOMYCIN HCL 1 G
1000 VIAL (EA) INTRAVENOUS ONCE
Refills: 0 | Status: COMPLETED | OUTPATIENT
Start: 2022-05-04 | End: 2022-05-04

## 2022-05-04 RX ORDER — ACETAMINOPHEN 500 MG
650 TABLET ORAL EVERY 6 HOURS
Refills: 0 | Status: DISCONTINUED | OUTPATIENT
Start: 2022-05-04 | End: 2022-05-05

## 2022-05-04 RX ORDER — MEROPENEM 1 G/30ML
1000 INJECTION INTRAVENOUS ONCE
Refills: 0 | Status: COMPLETED | OUTPATIENT
Start: 2022-05-04 | End: 2022-05-04

## 2022-05-04 RX ORDER — FOLIC ACID 0.8 MG
1 TABLET ORAL DAILY
Refills: 0 | Status: DISCONTINUED | OUTPATIENT
Start: 2022-05-04 | End: 2022-05-05

## 2022-05-04 RX ORDER — MEROPENEM 1 G/30ML
1000 INJECTION INTRAVENOUS EVERY 8 HOURS
Refills: 0 | Status: DISCONTINUED | OUTPATIENT
Start: 2022-05-04 | End: 2022-05-05

## 2022-05-04 RX ORDER — SODIUM CHLORIDE 9 MG/ML
2200 INJECTION INTRAMUSCULAR; INTRAVENOUS; SUBCUTANEOUS ONCE
Refills: 0 | Status: COMPLETED | OUTPATIENT
Start: 2022-05-04 | End: 2022-05-04

## 2022-05-04 RX ORDER — THIAMINE MONONITRATE (VIT B1) 100 MG
100 TABLET ORAL ONCE
Refills: 0 | Status: DISCONTINUED | OUTPATIENT
Start: 2022-05-04 | End: 2022-05-05

## 2022-05-04 RX ORDER — THIAMINE MONONITRATE (VIT B1) 100 MG
100 TABLET ORAL DAILY
Refills: 0 | Status: DISCONTINUED | OUTPATIENT
Start: 2022-05-05 | End: 2022-05-05

## 2022-05-04 RX ORDER — ACETAMINOPHEN 500 MG
650 TABLET ORAL ONCE
Refills: 0 | Status: COMPLETED | OUTPATIENT
Start: 2022-05-04 | End: 2022-05-04

## 2022-05-04 RX ADMIN — Medication 100 MILLIGRAM(S): at 16:32

## 2022-05-04 RX ADMIN — MEROPENEM 100 MILLIGRAM(S): 1 INJECTION INTRAVENOUS at 22:58

## 2022-05-04 RX ADMIN — Medication 1 TABLET(S): at 22:22

## 2022-05-04 RX ADMIN — SODIUM CHLORIDE 2200 MILLILITER(S): 9 INJECTION INTRAMUSCULAR; INTRAVENOUS; SUBCUTANEOUS at 16:32

## 2022-05-04 RX ADMIN — Medication 250 MILLIGRAM(S): at 20:42

## 2022-05-04 RX ADMIN — SODIUM CHLORIDE 75 MILLILITER(S): 9 INJECTION, SOLUTION INTRAVENOUS at 22:22

## 2022-05-04 RX ADMIN — SODIUM CHLORIDE 75 MILLILITER(S): 9 INJECTION, SOLUTION INTRAVENOUS at 23:34

## 2022-05-04 RX ADMIN — Medication 1 MILLIGRAM(S): at 22:22

## 2022-05-04 RX ADMIN — Medication 650 MILLIGRAM(S): at 16:32

## 2022-05-04 RX ADMIN — HEPARIN SODIUM 5000 UNIT(S): 5000 INJECTION INTRAVENOUS; SUBCUTANEOUS at 22:22

## 2022-05-04 RX ADMIN — MEROPENEM 100 MILLIGRAM(S): 1 INJECTION INTRAVENOUS at 17:39

## 2022-05-04 NOTE — H&P ADULT - PROBLEM SELECTOR PLAN 8
Chemical DVT ppx: HSQ 5000 TID, ambulate as tolerated  Diet: regular  Precautions: fall (LE wounds), seizure (EtOH withdrawal risk)

## 2022-05-04 NOTE — ED PROVIDER NOTE - PHYSICAL EXAMINATION
PHYSICAL EXAM:  CONSTITUTIONAL: Well appearing, awake, alert, oriented to person, place, time/situation and in no apparent distress.  HEAD: Atraumatic  EYES: Clear bilaterally, pupils equal, round and reactive to light.  ENMT: Airway patent, Nasal mucosa clear. Mouth with normal mucosa. Uvula is midline.   CARDIAC: Normal rate, regular rhythm.  +S1/S2.  No murmurs, rubs or gallops.  RESPIRATORY: Breathing unlabored. Breath sounds clear and equal bilaterally.  ABDOMEN:  Soft, nontender, nondistended. No hepatosplenomegaly.  NEUROLOGICAL: Alert and oriented, no focal deficits, no motor or sensory deficits. CN2-12 intact. Sensation intact x4 extremities.  SKIN: Skin warm and dry. Desquamating weeping rash on B/L feet volar surfaces extending up to calf, desquamating rash posterior to rectum and intertriginous gluteal and inguinal regions, red rash. Nontender to palpation. +Nikolsky's sign on feet. B/L palmar rash.

## 2022-05-04 NOTE — ED PROVIDER NOTE - PROGRESS NOTE DETAILS
Jaron BERNSTEIN: Dr. Dillard on phone with dermatology for consult now. Lynette Dillard DO PGY-1  Derm has seen patient in ED. Say low suspicion for necrotizing fasciitis, ok to cancel CT. Agree w/ continued empiric antibiotic treatment, closely monitoring for signs of mucosal involvement or worsening rash, admission. Lynette Dillard DO PGY-1  Discussed plan with hospitalist Dr. Pruett, who will admit the patient.

## 2022-05-04 NOTE — H&P ADULT - NSICDXFAMILYHX_GEN_ALL_CORE_FT
FAMILY HISTORY:  Family history of depression, mother    Father  Still living? Unknown  FH: skin cancer, Age at diagnosis: Age Unknown    Sibling  Still living? Unknown  Family history of alcohol abuse, Age at diagnosis: Age Unknown    Aunt  Still living? Unknown  FH: lupus, Age at diagnosis: Age Unknown

## 2022-05-04 NOTE — H&P ADULT - PROBLEM SELECTOR PLAN 9
1. Name of PCP: w/o PCP, will need to establish on d/c  2. PCP Contacted on Admission [ ] Y [ ] N [x] N/A  3. PCP Contacted on Discharge [ ] Y [ ] N [ ] N/A  4. Post discharge appointment Date and Location:  5. Summary of Handoff Given to PCP [ ] Y [ ] N

## 2022-05-04 NOTE — ED ADULT NURSE REASSESSMENT NOTE - NS ED NURSE REASSESS COMMENT FT1
Break RN note- patient resting quietly in bed, breathing even and nonlabored. No acute distress. Patient medicated as ordered. No complaints at this time. Safety maintained. Patient stable upon exiting the room.

## 2022-05-04 NOTE — PATIENT PROFILE ADULT - FALL HARM RISK - HARM RISK INTERVENTIONS
Assistance with ambulation/Assistance OOB with selected safe patient handling equipment/Communicate Risk of Fall with Harm to all staff/Discuss with provider need for PT consult/Monitor for mental status changes/Monitor gait and stability/Reinforce activity limits and safety measures with patient and family/Sit up slowly, dangle for a short time, stand at bedside before walking/Tailored Fall Risk Interventions/Use of alarms - bed, chair and/or voice tab/Visual Cue: Yellow wristband and red socks/Bed in lowest position, wheels locked, appropriate side rails in place/Call bell, personal items and telephone in reach/Instruct patient to call for assistance before getting out of bed or chair/Non-slip footwear when patient is out of bed/[place] to call system/Physically safe environment - no spills, clutter or unnecessary equipment/Purposeful Proactive Rounding/Room/bathroom lighting operational, light cord in reach

## 2022-05-04 NOTE — H&P ADULT - PROBLEM SELECTOR PLAN 7
hx of SA 2018 w/ [facility] admission, not currently on tx  reports good mood currently w/o SI/HI  - monitor sx

## 2022-05-04 NOTE — H&P ADULT - PROBLEM SELECTOR PLAN 3
#normocytic (MCV 93.9) anemia H/H 12.7/38.2 w/ elevated RDW 16.4  likely hypoproliferative iso chronic EtOH use  - anemia w/u in am, supplementing folate iso EtOH use   - monitor H/H    #thrombocytopenia plt 138  83 in 10/2021   likely hypoproliferative iso EtOH use  - monitor plt

## 2022-05-04 NOTE — H&P ADULT - PROBLEM SELECTOR PLAN 4
AST 42  390s in 2019  likely iso EtOH use   - monitor CMP  - consider outpt w/u w/ RUQ US, hep panel

## 2022-05-04 NOTE — H&P ADULT - PROBLEM SELECTOR PLAN 1
meets SIRS (febrile, tachycardic) w/ lactate elevation 2.3 iso dermatitis, poor po intake, insensible losses of wounds  WBC wnl  s/p hardy, clinda, IVF in ED  - broad spectrum abx vanc, zosyn, narrow pending w/u  - f/u BCx, UA, UCx  - rpt am lactate  - monitor WBC, fever curve  - IVF LR 75cc/hr meets SIRS (febrile, tachycardic) w/ lactate elevation 2.3 iso dermatitis, poor po intake, insensible losses of wounds  WBC wnl  s/p hardy, clinda, IVF in ED  - broad spectrum abx vanc, hardy iso PCN allergy, narrow pending w/u  - f/u BCx, UA, UCx  - rpt am lactate  - monitor WBC, fever curve  - IVF LR 75cc/hr meets SIRS (febrile, tachycardic) w/ lactate elevation 2.3 iso acute dermatitis with concern for superimposed cellulitis, poor po intake, insensible losses of wounds  WBC wnl  - s/p hardy, clinda, IVF in ED  - broad spectrum abx vanc, hardy iso PCN allergy, narrow pending w/u  - f/u BCx, UA, UCx  - rpt am lactate  - monitor WBC, fever curve  - IVF LR 75cc/hr

## 2022-05-04 NOTE — H&P ADULT - NSHPPHYSICALEXAM_GEN_ALL_CORE
PHYSICAL EXAM:  GENERAL: NAD, well-groomed, well-developed, laying in ED stretcher, calm and very pleasant to interview  HEAD: Atraumatic, Normocephalic  EYES: PERRL, conjunctiva and sclera clear +arcus senilis   ENMT: No tonsillar erythema, exudates, or enlargement; Moist mucous membranes w/o ulceration  NECK: Supple  NERVOUS SYSTEM: Alert & Oriented X3, Good concentration; Motor Strength 5/5 B/L upper and lower extremities. Sensation intact and equal b/l UE and LE  CHEST/LUNG: Clear to auscultation bilaterally; No rales, rhonchi, wheezing, or rubs  HEART: +Tachycardic w/ regular rhythm; No murmurs, rubs, or gallops  ABDOMEN: Soft, Nontender, Nondistended; Bowel sounds present. No guarding, rebound tenderness, or rigidity.  EXTREMITIES: 2+ Peripheral Pulses  SKIN: b/l feet erythematous w/ denuded/desquamated patches most prominent over plantar aspect w/o tendeness to palpation; b/l hands of similar character to feet w/ denuded/desquamated patches most prominent over palmar aspect also w/o tenderness to palpation; b/l thighs, distal UE, abd and chest w/ erythematous papules among confluent erythematous patches also w/o tenderness to palpation. Difficult to assess warmth of areas given pt febrile. PHYSICAL EXAM:  GENERAL: NAD, well-groomed, well-developed, laying in ED stretcher, calm and very pleasant to interview  HEAD: Atraumatic, Normocephalic  EYES: PERRL, conjunctiva and sclera clear +arcus senilis   ENMT: No tonsillar erythema, exudates, or enlargement; Moist mucous membranes w/o ulceration  NECK: Supple  NERVOUS SYSTEM: Alert & Oriented X3, Good concentration; Motor Strength 5/5 B/L upper and lower extremities. Sensation intact and equal b/l UE and LE  CHEST/LUNG: Clear to auscultation bilaterally; No rales, rhonchi, wheezing, or rubs  HEART: +Tachycardic w/ regular rhythm; No murmurs, rubs, or gallops  ABDOMEN: Soft, Nontender, Nondistended; Bowel sounds present. No guarding, rebound tenderness, or rigidity.  EXTREMITIES: 2+ Peripheral Pulses  SKIN: b/l feet erythematous w/ denuded/desquamated patches most prominent over plantar aspect w/o tendeness to palpation; b/l hands of similar character to feet w/ denuded/desquamated patches most prominent over palmar aspect also w/o tenderness to palpation; b/l thighs, distal UE, abd and chest w/ erythematous papules among confluent erythematous patches also w/o tenderness to palpation. Difficult to assess warmth of areas given pt febrile. W/o overt bullae to assess Nikolsky sign (documented in ED provider note) PHYSICAL EXAM:  GENERAL: NAD, well-groomed, well-developed, laying in ED stretcher, calm and very pleasant to interview  HEAD: Atraumatic, Normocephalic  EYES: PERRL, conjunctiva and sclera clear +arcus senilis   ENMT: No tonsillar erythema, exudates, or enlargement; Moist mucous membranes w/o ulceration  NECK: Supple  NERVOUS SYSTEM: Alert & Oriented X3, Good concentration; Motor Strength 5/5 B/L upper and lower extremities. Sensation intact and equal b/l UE and LE  CHEST/LUNG: Clear to auscultation bilaterally; No rales, rhonchi, wheezing, or rubs. No stridor  HEART: +Tachycardic w/ regular rhythm; No murmurs, rubs, or gallops  ABDOMEN: Soft, Nontender, Nondistended; Bowel sounds present. No guarding, rebound tenderness, or rigidity.  EXTREMITIES: 2+ Peripheral Pulses  SKIN: b/l feet erythematous w/ denuded/desquamated patches most prominent over plantar aspect w/o tendeness to palpation; b/l hands of similar character to feet w/ denuded/desquamated patches most prominent over palmar aspect also w/o tenderness to palpation; b/l thighs, distal UE, abd and chest w/ erythematous papules among confluent erythematous patches also w/o tenderness to palpation. Difficult to assess warmth of areas given pt febrile. W/o overt bullae to assess Nikolsky sign (documented in ED provider note) Vital Signs Last 24 Hrs  T(C): 36.7 (05 May 2022 04:55), Max: 38.5 (04 May 2022 16:02)  T(F): 98 (05 May 2022 04:55), Max: 101.3 (04 May 2022 16:02)  HR: 74 (05 May 2022 04:55) (74 - 105)  BP: 141/83 (05 May 2022 04:55) (116/58 - 144/79)  RR: 17 (05 May 2022 04:55) (15 - 17)  SpO2: 100% (05 May 2022 04:55) (99% - 100%)    PHYSICAL EXAM:  GENERAL: NAD, well-groomed, well-developed, laying in ED stretcher, calm and very pleasant to interview  HEAD: Atraumatic, Normocephalic  EYES: PERRL, conjunctiva and sclera clear +arcus senilis   ENMT: No tonsillar erythema, exudates, or enlargement; Moist mucous membranes w/o ulceration  NECK: Supple  NERVOUS SYSTEM: Alert & Oriented X3, Good concentration; Motor Strength 5/5 B/L upper and lower extremities. Sensation intact and equal b/l UE and LE  CHEST/LUNG: Clear to auscultation bilaterally; No rales, rhonchi, wheezing, or rubs. No stridor  HEART: +Tachycardic w/ regular rhythm; No murmurs, rubs, or gallops  ABDOMEN: Soft, Nontender, Nondistended; Bowel sounds present. No guarding, rebound tenderness, or rigidity.  EXTREMITIES: 2+ Peripheral Pulses  SKIN: b/l feet erythematous w/ denuded/desquamated patches most prominent over plantar aspect w/o tenderness to palpation; b/l hands of similar character to feet w/ denuded/desquamated patches most prominent over palmar aspect also w/o tenderness to palpation; b/l thighs, distal UE, abd and chest w/ erythematous papules among confluent erythematous patches also w/o tenderness to palpation. Difficult to assess warmth of areas given pt febrile. W/o overt bullae to assess Nikolsky sign (documented in ED provider note) Vital Signs Last 24 Hrs  T(C): 36.7 (05 May 2022 04:55), Max: 38.5 (04 May 2022 16:02)  T(F): 98 (05 May 2022 04:55), Max: 101.3 (04 May 2022 16:02)  HR: 74 (05 May 2022 04:55) (74 - 105)  BP: 141/83 (05 May 2022 04:55) (116/58 - 144/79)  RR: 17 (05 May 2022 04:55) (15 - 17)  SpO2: 100% (05 May 2022 04:55) (99% - 100%)    PHYSICAL EXAM:  GENERAL: NAD, well-groomed, well-developed, laying in ED stretcher, calm and very pleasant to interview  HEAD: Atraumatic, normocephalic  EYES: PERRL, conjunctiva and sclera clear, arcus senilis present  MOUTH: No tonsillar erythema, exudates, or enlargement. Moist mucous membranes w/o ulceration.  NECK: Supple, full ROM, no JVD, no LAD, trachea midline  NERVOUS SYSTEM: Alert & Oriented X3, good concentration. Motor Strength 5/5 B/L upper and lower extremities. Sensation intact and equal B/L upper and lower extremities. No focal deficits.  PSYCHIATRIC: Full affect. Denies depressed mood. Denies SI or HI.  CHEST/LUNG: Good inspiratory effort. Nonlabored breathing. Clear to auscultation bilaterally. No rales, rhonchi, wheezing, or rubs. No stridor.  HEART: Tachycardic w/ regular rhythm. Normal S1 and S2. No murmurs, rubs, or gallops. LE edema from desquamating rash.  ABDOMEN: Soft, nontender, nondistended. Bowel sounds present. No guarding, rebound tenderness, or rigidity.  EXTREMITIES: 2+ peripheral pulses b/l. No cyanosis.  SKIN: B/l feet erythematous w/ denuded/desquamated patches most prominent over plantar aspect w/o tenderness to palpation; b/l hands of similar character to feet w/ denuded/desquamated patches most prominent over palmar aspect also w/o tenderness to palpation; b/l thighs, distal UE, abd and chest w/ erythematous papules among confluent erythematous patches also w/o tenderness to palpation. Difficult to assess warmth of areas given pt febrile. W/o overt bullae to assess Nikolsky sign (documented in ED provider note).

## 2022-05-04 NOTE — H&P ADULT - PROBLEM SELECTOR PLAN 6
EtOH use 3-4/wk, 14 drink/wk on avg  denies withdrawal sx   - sx triggered ativan, monitor CIWA score  - SBIRT c/s EtOH use 3-4/wk, 14 drink/wk on avg  denies withdrawal sx   - sx triggered ativan, monitor CIWA score  - thiamine and folate supplementation  - SBIRT c/s EtOH use 3-4/wk, 14 drink/wk on avg  denies withdrawal sx   - sx triggered ativan, monitor CIWA score  - thiamine and folate supplementation  - MVI supplementation  - SBIRT c/s

## 2022-05-04 NOTE — H&P ADULT - NSICDXPASTMEDICALHX_GEN_ALL_CORE_FT
PAST MEDICAL HISTORY:  Alcohol abuse     Depression     History of marijuana use     Suicide attempt 2018

## 2022-05-04 NOTE — ED PROVIDER NOTE - OBJECTIVE STATEMENT
51 year old male with no PMH presents with 5 days of desquamating rash on hands and feet, started on feet and progressed to groin and hands. No mucosal involvement. Patient also reports chills starting yesterday. Febrile in ED. Denies headache, cough, congestion, chest pain, shortness of breath, nausea, vomiting, diarrhea, abdominal pain, dysuria, hematuria. Got COVID booster (pfizer) 10 days prior to symptom onset. Used decinex to the area which did not help symptoms and caused weeping wounds.

## 2022-05-04 NOTE — H&P ADULT - NSHPLABSRESULTS_GEN_ALL_CORE
LABS:                        12.7   10.25 )-----------( 138      ( 04 May 2022 16:30 )             38.2     05-04    138  |  103  |  6<L>  ----------------------------<  104<H>  3.8   |  22  |  0.49<L>    Ca    8.5      04 May 2022 16:30    TPro  6.4  /  Alb  3.9  /  TBili  1.1  /  DBili  x   /  AST  42<H>  /  ALT  20  /  AlkPhos  102  05-04    PT/INR - ( 04 May 2022 16:30 )   PT: 19.0 sec;   INR: 1.63 ratio         PTT - ( 04 May 2022 16:30 )  PTT:33.6 sec        RADIOLOGY & ADDITIONAL TESTS:    < from: Xray Chest 1 View- PORTABLE-Urgent (05.04.22 @ 17:01) >      IMPRESSION:  No focal consolidation.        ******PRELIMINARY REPORT******      ******PRELIMINARY REPORT******       LORA ORTEGA MD; Resident Radiologist    < end of copied text >    < from: Xray Foot AP + Lateral, Bilateral (05.04.22 @ 17:01) >    IMPRESSION:  No acute fracture or dislocation.        ******PRELIMINARY REPORT******      ******PRELIMINARY REPORT******       LORA ORTEGA MD; Resident Radiologist    < end of copied text >    EKG not available, ordered Labs personally reviewed.                        12.7   10.25 )-----------( 138      ( 04 May 2022 16:30 )             38.2     05-04    138  |  103  |  6<L>  ----------------------------<  104<H>  3.8   |  22  |  0.49<L>    Ca    8.5      04 May 2022 16:30    TPro  6.4  /  Alb  3.9  /  TBili  1.1  /  DBili  x   /  AST  42<H>  /  ALT  20  /  AlkPhos  102  05-04    PT/INR - ( 04 May 2022 16:30 )   PT: 19.0 sec;   INR: 1.63 ratio       PTT - ( 04 May 2022 16:30 )  PTT:33.6 sec      Imaging personally reviewed.    Xray Chest 1 View- PORTABLE-Urgent (05.04.22 @ 17:01)    IMPRESSION:  No focal consolidation.    ******PRELIMINARY REPORT******      ******PRELIMINARY REPORT******    Xray Foot AP + Lateral, Bilateral (05.04.22 @ 17:01)     IMPRESSION:  No acute fracture or dislocation.    EKG personally reviewed.  NSR at 75 bpm. TWI in V2 and V3, but <1 mm, QTc 489 ms.

## 2022-05-04 NOTE — H&P ADULT - ATTENDING COMMENTS
50 yo M with PMH of EtOH use (w/o hx of severe withdrawal, ICU admission, or intubation, last drink in am of admit ), daily marijuana use. depression w/ SA in 2018 (not on therapy), a/w sepsis iso acute dermatitis with concern for superimposed cellulitis, initially of b/l feet now extending diffusely, derm c/s in ED c/f hypersensitivity rxn vs less likely TSS vs connective tissue dz vs adult Kawasaki dz, low suspicion SJS/TEN; s/p skin bx. On Vanc and Jaleesa for cellulitis and risk of other skin/soft tissue infections given desquamation and weeping wounds.

## 2022-05-04 NOTE — ED ADULT TRIAGE NOTE - CHIEF COMPLAINT QUOTE
c/o BL hand and foot rash, weeping, malodorous, Pt states is on groin area, sock noted to be soiled hx of ETOH, last drink this AM 1 beer c/o BL hand and foot "rash", weeping, malodorous, Pt states is on groin area, sock noted to be soiled hx of ETOH, last drink this AM 1 beer

## 2022-05-04 NOTE — ED PROVIDER NOTE - NS ED ROS FT
ROS:  -Constitutional: +fever  -Head: Denies headache  -Eyes: Denies blurry vision  -Cardiovascular: Denies chest pain  -Pulmonary: Denies shortness of breath  -Gastrointestinal: Denies nausea or diarrhea  -Genitourinary: Denies dysuria  -Skin: +rashes  -Neuro: Denies numbness or tingling

## 2022-05-04 NOTE — H&P ADULT - ASSESSMENT
51M PMH EtOH use (w/o hx of severe withdrawal, ICU admission, or intubation, last drink in am of admit ), daily marijuana use. depression w/ SA 2018 (not on therapy), who p/w rash 51M PMH EtOH use (w/o hx of severe withdrawal, ICU admission, or intubation, last drink in am of admit ), daily marijuana use. depression w/ SA 2018 (not on therapy), a/w sepsis iso dermatitis initially of b/l feet now extending diffusely, derm c/s in ED c/f hypersensitivity rxn vs less likely TSS vs connective tissue dz vs adult Kawasaki dz, low suspicion SJS/TEN; s/p skin bx  52 yo M PMH EtOH use (w/o hx of severe withdrawal, ICU admission, or intubation, last drink in am of admit ), daily marijuana use. depression w/ SA 2018 (not on therapy), a/w sepsis iso dermatitis initially of b/l feet now extending diffusely, derm c/s in ED c/f hypersensitivity rxn vs less likely TSS vs connective tissue dz vs adult Kawasaki dz, low suspicion SJS/TEN; s/p skin bx

## 2022-05-04 NOTE — ED PROVIDER NOTE - ATTENDING CONTRIBUTION TO CARE
Patient is a 50 yo M with history of depression, history of weekly alcohol use, not on any medications here for evaluation of rash to bilateral feet, spreading to legs and buttocks, abdomen and hands. Patient reports he put desitin powder on his feet and states they started to weep and his skin started to peel. Rash started 5 days ago. He reports feeling cold yesterday and on arrival to the emergency department had a fever of 101.3. No chest pain, shortness of breath, nausea, vomiting, diarrhea. No sore throat or runny nose. He denies travel or sick contacts. He received a Pfizer booster for COVID19 1 week before symptoms started. He states he had previously received the Moderna COVID vaccine and booster.     VS noted  Gen. no acute distress, Non toxic   HEENT: EOMI, mmm,   Lungs: CTAB/L no C/ W /R   CVS: RRR   Abd; Soft non tender, non distended   Ext/ Skin: feet with bilateral edema and desquamating rash to toes with peeling skin to plantar surface and toes, papular rash to bilateral inguinal area, erythema noted to buttocks with peeling skin,  bilateral hands with erythema, swelling, peeling skin, no oral lesions  Neuro AAOx3 non focal clear speech  a/p: desquamating rash to bilateral feet with swelling, febrile here in the ED. Plan for sepsis labs, Abx, urgent dermatology consult.   - Jaron BERNSTEIN Patient is a 52 yo M with history of depression, history of weekly alcohol use, not on any medications here for evaluation of rash to bilateral feet, spreading to legs and buttocks, abdomen and hands. Patient reports he put desitin powder on his feet and states they started to weep and his skin started to peel. Rash started 5 days ago. He reports feeling cold yesterday and on arrival to the emergency department had a fever of 101.3. No chest pain, shortness of breath, nausea, vomiting, diarrhea. No sore throat or runny nose. He denies travel or sick contacts. He received a Pfizer booster for COVID19 1 week before symptoms started. He states he had previously received the Moderna COVID vaccine and booster. Denies any other new products.     VS noted  Gen. no acute distress, Non toxic   HEENT: EOMI, mmm, pharynx normal  Lungs: CTAB/L no C/ W /R   CVS: RRR   Abd; Soft non tender, non distended   Ext/ Skin: feet with bilateral edema and desquamating rash to toes with peeling skin to plantar surface and toes, papular rash to bilateral inguinal area, erythema noted to buttocks with peeling skin,  bilateral hands with erythema, swelling, peeling skin, no oral lesions  Neuro AAOx3 non focal clear speech  a/p: desquamating rash to bilateral feet with swelling, febrile here in the ED. Plan for sepsis labs, Abx, urgent dermatology consult.   - Jaron BERNSTEIN

## 2022-05-04 NOTE — H&P ADULT - HISTORY OF PRESENT ILLNESS
51M PMH EtOH use (w/o hx of severe withdrawal, ICU admission, or intubation), daily marijuana use. depression w/ SA 2018 (not on therapy), who p/w rash. Pt reports [facility] when 5 days ago he experienced sudden onset rash on b/l feet, w/o assc pain, pruritis, or numbness. He attempted palliation of sx w/ Desitin powder w/o success. Next, the area of the rash began to weep, and he noticed a rash of similar character on b/l hands. Further, he began to experience a rash of different character including red bumps which occurred in b/l LE, groin, buttocks, abd, and back, also w/o pain, pruritis, or numbness. Pt felt cold today w/o shaking chills, prompting presentation to ED. Otherwise he denies fever, visual disturbance, eye pain, rhinorrhea, dysphagia, odynophagia, CP, palpitations, SOB, cough, abd pain, n/v/d, melena, hematochezia, dysuria, hematuria, numbness/tingling/weakness, SI or HI. He denies recent travel or sick contact; no use of new creams, detergents, perfume, air freshener; no outdoor activities or insect bites. He is not sexually active, and denies any rash/penile discharge. Of note, no episodes of similar severity in past, however he reports lifetime hx of sensitive skin which easily breaks into rash,  for which he uses mild detergents/soaps. Also, he reports that he routinely uses extremely hot water to wash his hands, however denies bathing entire body in hot water.     ED Course  VS: febrile Tmax 38.5C, tachycardic HR 80s-100s, otherwise VS grossly wnl   Labs: normocytic (MCV 93.9) anemia H/H 12.7/38.2 w/ elevated RDW 16.4; thrombocytopenia 138; INR elevated 1.63; elevated AST 42; lactate elevation 2.3  Micro: RVP/COV2/Bordetella parapertussis not det  Imaging: CXR w/o acute findings; XR tibia + fibula 2 views b/l +increased soft tissue swelling of b/l feet  Received: clindamycin 900mg IV, meropenem 1g IV, NS 2.2L IV    Derm c/s in ED    Admit to medicine for further eval/mgt   51M PMH EtOH use (w/o hx of severe withdrawal, ICU admission, or intubation), daily marijuana use. depression w/ SA 2018 (not on therapy), who p/w rash. Pt reports [facility] when 5 days ago he experienced sudden onset rash on b/l feet, w/o assc pain, pruritis, or numbness. He attempted palliation of sx w/ Desitin powder w/o success. Next, the area of the rash began to weep, and he noticed a rash of similar character on b/l hands. Further, he began to experience a rash of different character including red bumps which occurred in b/l LE, groin, buttocks, abd, and back, also w/o pain, pruritis, or numbness. He has decreased po intake iso difficulty ambulating to kitchen iso b/l foot rash. Pt felt cold today w/o shaking chills, prompting presentation to ED. Otherwise he denies fever, visual disturbance, eye pain, rhinorrhea, dysphagia, odynophagia, CP, palpitations, SOB, cough, abd pain, n/v/d, melena, hematochezia, dysuria, hematuria, numbness/tingling/weakness, SI or HI. He denies recent travel or sick contact; no use of new creams, detergents, perfume, air freshener; no outdoor activities or insect bites. He is not sexually active, and denies any rash/penile discharge. Of note, no episodes of similar severity in past, however he reports lifetime hx of sensitive skin which easily breaks into rash,  for which he uses mild detergents/soaps. Also, he reports that he routinely uses extremely hot water to wash his hands, however denies bathing entire body in hot water.     ED Course  VS: febrile Tmax 38.5C, tachycardic HR 80s-100s, otherwise VS grossly wnl   Labs: normocytic (MCV 93.9) anemia H/H 12.7/38.2 w/ elevated RDW 16.4; thrombocytopenia 138; INR elevated 1.63; elevated AST 42; lactate elevation 2.3  Micro: RVP/COV2/Bordetella parapertussis not det  Imaging: CXR w/o acute findings; XR tibia + fibula 2 views b/l +increased soft tissue swelling of b/l feet  Received: clindamycin 900mg IV, meropenem 1g IV, NS 2.2L IV    Derm c/s in ED    Admit to medicine for further eval/mgt   51M PMH EtOH use (w/o hx of severe withdrawal, ICU admission, or intubation), daily marijuana use. depression w/ SA 2018 (not on therapy), who p/w rash. Pt reports [facility] when 5 days ago he experienced sudden onset rash on b/l feet, w/o assc pain, pruritis, or numbness. He attempted palliation of sx w/ Desitin powder w/o success. Next, the area of the rash began to weep, and he noticed a rash of similar character on b/l hands. Further, he began to experience a rash of different character including red bumps which occurred in b/l LE, groin, buttocks, abd, and back, also w/o pain, pruritis, or numbness. He has decreased po intake iso difficulty ambulating to kitchen iso b/l foot rash. Pt felt cold today w/o shaking chills, prompting presentation to ED. Otherwise he denies fever, visual disturbance, eye pain, rhinorrhea, dysphagia, odynophagia, CP, palpitations, SOB, cough, abd pain, n/v/d, melena, hematochezia, dysuria, hematuria, numbness/tingling/weakness, SI or HI. He denies recent travel or sick contact; no use of new creams, detergents, perfume, air freshener; no outdoor activities or insect bites. He is not sexually active, and denies any rash/penile discharge. Of note, no episodes of similar severity in past, however he reports lifetime hx of sensitive skin which easily breaks into rash,  for which he uses mild detergents/soaps. Also, he reports that he routinely uses extremely hot water to wash his hands, however denies bathing entire body in hot water. Recent Pfizer COVID-19 booster 1 wk ago.    ED Course  VS: febrile Tmax 38.5C, tachycardic HR 80s-100s, otherwise VS grossly wnl   Labs: normocytic (MCV 93.9) anemia H/H 12.7/38.2 w/ elevated RDW 16.4; thrombocytopenia 138; INR elevated 1.63; elevated AST 42; lactate elevation 2.3  Micro: RVP/COV2/Bordetella parapertussis not det  Imaging: CXR w/o acute findings; XR tibia + fibula 2 views b/l +increased soft tissue swelling of b/l feet  Received: clindamycin 900mg IV, meropenem 1g IV, NS 2.2L IV    Derm c/s in ED    Admit to medicine for further eval/mgt   51M PMH EtOH use (w/o hx of severe withdrawal, ICU admission, or intubation), daily marijuana use, depression w/ SA 2018 (not on therapy), who p/w rash. Pt reports [facility] when 5 days ago he experienced sudden onset rash on b/l feet, w/o assc pain, pruritis, or numbness. He attempted palliation of sx w/ Desitin powder w/o success. Next, the area of the rash began to weep, and he noticed a rash of similar character on b/l hands. Further, he began to experience a rash of different character including red bumps which occurred in b/l LE, groin, buttocks, abd, and back, also w/o pain, pruritis, or numbness. He has decreased po intake iso difficulty ambulating to kitchen iso b/l foot rash. Pt felt cold today w/o shaking chills, prompting presentation to ED. Otherwise he denies fever, visual disturbance, eye pain, rhinorrhea, dysphagia, odynophagia, CP, palpitations, SOB, cough, abd pain, n/v/d, melena, hematochezia, dysuria, hematuria, numbness/tingling/weakness, SI or HI. He denies recent travel or sick contact; no use of new creams, detergents, perfume, air freshener; no outdoor activities or insect bites. He is not sexually active, and denies any rash/penile discharge. Of note, no episodes of similar severity in past, however he reports lifetime hx of sensitive skin which easily breaks into rash,  for which he uses mild detergents/soaps. Also, he reports that he routinely uses extremely hot water to wash his hands, however denies bathing entire body in hot water. Recent Pfizer COVID-19 booster 1 wk ago.    ED Course  VS: febrile Tmax 38.5C, tachycardic HR 80s-100s, otherwise VS grossly wnl   Labs: normocytic (MCV 93.9) anemia H/H 12.7/38.2 w/ elevated RDW 16.4; thrombocytopenia 138; INR elevated 1.63; elevated AST 42; lactate elevation 2.3  Micro: RVP/COV2/Bordetella parapertussis not det  Imaging: CXR w/o acute findings; XR tibia + fibula 2 views b/l +increased soft tissue swelling of b/l feet  Received: clindamycin 900mg IV, meropenem 1g IV, NS 2.2L IV    Derm c/s in ED    Admit to medicine for further eval/mgt   50 yo M with PMH of EtOH use (w/o hx of severe withdrawal, ICU admission, or intubation), daily marijuana use, depression w/ SA in 2018 (not on therapy), who p/w rash. Pt reports being in baseline state of health when 5 days ago he experienced sudden onset rash on b/l feet, w/o associated pain, pruritis, or numbness. He attempted palliation of sx w/ Desitin powder w/o success. Next, the area of the rash began to weep, and he noticed a rash of similar character on b/l hands. Further, he began to experience a rash of different character including red bumps which occurred in b/l LE, groin, buttocks, abdomen, and back, also w/o pain, pruritis, or numbness. He has decreased PO intake iso difficulty ambulating to kitchen iso b/l foot rash. Pt felt cold today w/o shaking chills, prompting presentation to ED. Otherwise he denies fevers, visual disturbance, eye pain, rhinorrhea, dysphagia, odynophagia, CP, palpitations, SOB, cough, abdominal pain, n/v/d, melena, hematochezia, dysuria, hematuria, numbness/tingling/weakness, SI or HI. He denies recent travel or sick contact; no use of new creams, detergents, perfume, air freshener; no outdoor activities or insect bites. He is not sexually active, and denies any rash/penile discharge. Of note, no episodes of similar severity in past, however he reports lifetime hx of sensitive skin which easily breaks into rash, for which he uses mild detergents/soaps. Also, he reports that he routinely uses extremely hot water to wash his hands, however denies bathing entire body in hot water. Recent Pfizer COVID-19 booster 1 wk ago.    ED Course  VS: febrile Tmax 38.5C, tachycardic HR 80s-100s, otherwise VS grossly wnl   Labs: normocytic (MCV 93.9) anemia H/H 12.7/38.2 w/ elevated RDW 16.4; thrombocytopenia 138; INR elevated 1.63; elevated AST 42; lactate elevation 2.3  Micro: RVP/COV2/Bordetella parapertussis not det  Imaging: CXR w/o acute findings; XR tibia + fibula 2 views b/l +increased soft tissue swelling of b/l feet  Received: clindamycin 900mg IV, meropenem 1g IV, NS 2.2L IV    Derm c/s in ED    Admit to medicine for further eval/mgt

## 2022-05-04 NOTE — H&P ADULT - ATTENDING SUPERVISION STATEMENT
.. [address]  Phone: [phone]  Fax: [phone]    March 2, 2022    [address] [facility] [address] [address]    Dear Adriano Patient,    Thank you for choosing our [facility] on 2/28/22. Dr. All Barnett wanted to make sure that you understand your discharge instructions and that you were able to fill any prescriptions that may have been ordered for you. Please contact the office at the above phone number if advised you to follow up with Dr. All Barnett, or if you have any further questions or needs. Also, did you know -                             [facility]) practices can often offer you an appointment on the same day that you call for acute issues. *We have some [facility] offices that offer Walk-in appointments; check our website for availability in your community, [email or web address].      *Evisits are now available for patients through [address] If you do not have [facility] and are interested, please contact the office and a staff member may assist you or go to [email or web address].     Sincerely,     All Barnett MD and your [facility] [place]
Resident

## 2022-05-04 NOTE — H&P ADULT - NSHPREVIEWOFSYSTEMS_GEN_ALL_CORE
REVIEW OF SYSTEMS:  CONSTITUTIONAL: No fever +chills  EYES: No visual disturbances or eye pain  ENMT: No sinus or throat pain  RESPIRATORY: No shortness of breath or cough  CARDIOVASCULAR: No chest pain, palpitations, or dizziness  GASTROINTESTINAL: No abdominal or epigastric pain. No diarrhea or constipation. No melena or hematochezia.   GENITOURINARY: No dysuria or hematuria  NEUROLOGICAL: No headaches, loss of strength or numbness  MUSCULOSKELETAL: No joint pain or swelling; No muscle, back, or extremity pain  SKIN: +diffuse rash as per HPI Constitutional: +Mild chills. No generalized weakness, fevers, or weight loss.  Eyes: No visual changes, double vision, or eye pain  Ears, Nose, Mouth, Throat: No runny nose, sinus pain, ear pain, tinnitus, sore throat, dysphagia, or odynophagia  Cardiovascular: No chest pain, palpitations, or LE edema  Respiratory: No cough, wheezing, hemoptysis, or shortness of breath  Gastrointestinal: No abdominal pain, dysphagia, anorexia, nausea/vomiting, diarrhea/constipation, hematemesis, melena, or BRBPR  Genitourinary: No dysuria, frequency, urgency, or hematuria  Musculoskeletal: No back pain or joint pain, swelling, or decreased ROM  Skin: +Diffuse desquamating and weeping rash   Neurologic: No syncope, seizures, headache, paresthesias, numbness, or limb weakness  Psychiatric: No depression, anxiety, or agitation  Endocrine: No heat/cold intolerance, mood swings, sweats, polydipsia, or polyuria  Hematologic/lymphatic: No purpura, petechia, or prolonged or excessive bleeding after dental extraction / injury    Positives and pertinent negatives noted and all other systems negative.

## 2022-05-04 NOTE — H&P ADULT - NSHPSOCIALHISTORY_GEN_ALL_CORE
Denies tobacco use; reports 3-4 days/wk EtOH use total 14 drinks/wk on avg; daily marijuana use (3 joints/day). Lives w/ father, independent in ADLs, ambulates w/o assistive devices. 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 Born in . Denies tobacco use; reports 3-4 days/wk EtOH use total 14 drinks/wk on avg last drink this am; daily marijuana use (3 joints/day). Lives w/ father, independent in ADLs, ambulates w/o assistive devices. 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 Born in . Denies tobacco use  Reports 3-4 days/wk EtOH use, total 14 drinks/wk on avg, last drink this AM  Reports daily marijuana use (3 joints/day)  Lives w/ father, independent in ADLs, ambulates w/o assistive devices.  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 Born in .

## 2022-05-04 NOTE — ED PROVIDER NOTE - CLINICAL SUMMARY MEDICAL DECISION MAKING FREE TEXT BOX
Lynette Dillard DO PGY-1  51 year old male with no PMH , weekly alcohol use presents with rash to B/L feet spreading to legs, groin, buttocks, abdomen, hands. Febrile. Hemodynamically stable. Concern for SJS vs. toxic shock vs. sepsis. Will treat with abx, ivf, do labs, consult derm, closely monitor, and re-evaluate. Lynette Dillard DO PGY-1  51 year old male with no PMH , weekly alcohol use presents with rash to B/L feet spreading to legs, groin, buttocks, abdomen, hands. Febrile. Hemodynamically stable. Concern for SJS vs. toxic shock vs. sepsis. Will treat with abx, ivf, do labs, consult derm, closely monitor, and re-evaluate.    Jaron BERNSTEIN: Patient seen and examined. Agree with above. Plan for sepsis work up, urgent derm consult. TBA.

## 2022-05-04 NOTE — CHART NOTE - NSCHARTNOTEFT_GEN_A_CORE
Dermatology Chart Note    Patient is a 50 yo M with history of depression, history of weekly alcohol use, not on any medications here for evaluation of rash to bilateral feet, spreading to legs and buttocks, abdomen and hands. Patient reports he put Desenex (anti-fungal) powder on his feet and states they started to weep and his skin started to peel. Rash started 5 days ago. He reports feeling cold yesterday and on arrival to the emergency department had a fever of 101.3. No chest pain, shortness of breath, nausea, vomiting, diarrhea. No sore throat or runny nose. He denies travel or sick contacts. He received a Pfizer booster for COVID19 1 week before symptoms started. He states he had previously received the Moderna COVID vaccine and booster. Denies any other new products    Dermatology consulted for rash. Per patient, rash started as few denuded spots on his b/l feet about 5 days ago. Asx. That is when he started applying Desenex powder to the area which he was doing until yesterday. About 3 days ago, he noticed it spreading to his hands. Yesterday he noticed some involvement of the groin and today he noticed little red bumps appearing over his trunk and extremities. These new bumps are mildly pruritic, otherwise patient denies any skin pruritus/discomfort/pain. No ocular, oral, or genital mucosal involvement. Patient reports feeling well overall and was surprised to be told he had a fever while in the hospital. No prior history of skin rashes.       PHYSICAL EXAM:  Skin exam notable for:  All ten toes with erythematous patches with overlying areas of moist desquamation, most prominent in the web spaces  Soles of b/l feet with pink macerated patches and areas of denudation  B/l palms with macerated pink patches with areas of denudation  Khadijah-anal and inguinal fold area with pink patches and peripherally scattered pink papules      ASSESSMENT/PLAN:  1. Dermatitis- Differential diagnosis remains broad at this time. Favor a hypersensitivity reaction (either to bullous tinea pedis vs to COVID booster vaccine vs to contact dermatitis patient developed from use of foot powder) vs less likely Toxic Shock Syndrome vs Connective Tissue Disease vs Adult Kawasaki Disease. Very low suspicion for SJS/TEN at this time given overall well appearing nature of patient, lack of mucosal involvement, and distribution and morphology of rash.    At this time:  - Agree with full sepsis workup and antibiotic coverage for now, especially in the setting of fever  - Fungal culture swab of feet performed, will follow up results  - Tissue biopsy performed as per procedure note below, will follow up results  - Will continue to monitor patient closely to see how rash evolves as clinical progression may help elucidate diagnosis   - Will hold off on any additional treatment at this time pending infectious workup   - If patient develops desquamation of new areas, skin pain, or any mucosal involvement overnight, please make sure to inform dermatology team right away       Discussed with primary team.  Discussed with attending, Dr. Esau Wang MD  Chief Resident, Dermatology

## 2022-05-04 NOTE — ED ADULT NURSE NOTE - CHIEF COMPLAINT QUOTE
c/o BL hand and foot "rash", weeping, malodorous, Pt states is on groin area, sock noted to be soiled hx of ETOH, last drink this AM 1 beer

## 2022-05-04 NOTE — ED ADULT NURSE NOTE - OBJECTIVE STATEMENT
received pt in room 10, 51 yr/o male A+Ox4, ambulatory at baseline. pt presented to the ED C/O rash with molting skin that has spread to his groin and hands, onset of rash was 4/30. pt denies pain at or itchiness at rash site. pt is febrile, MD made aware. right AC 20g placed, labs drawn and sent. medications given as ordered. will continue to monitor.

## 2022-05-05 ENCOUNTER — TRANSCRIPTION ENCOUNTER (OUTPATIENT)
Age: 52
End: 2022-05-05

## 2022-05-05 VITALS
DIASTOLIC BLOOD PRESSURE: 71 MMHG | SYSTOLIC BLOOD PRESSURE: 145 MMHG | HEART RATE: 95 BPM | OXYGEN SATURATION: 100 % | RESPIRATION RATE: 17 BRPM | TEMPERATURE: 99 F

## 2022-05-05 LAB
A1C WITH ESTIMATED AVERAGE GLUCOSE RESULT: 4.8 % — SIGNIFICANT CHANGE UP (ref 4–5.6)
ALBUMIN SERPL ELPH-MCNC: 3 G/DL — LOW (ref 3.3–5)
ALP SERPL-CCNC: 84 U/L — SIGNIFICANT CHANGE UP (ref 40–120)
ALT FLD-CCNC: 17 U/L — SIGNIFICANT CHANGE UP (ref 4–41)
ANION GAP SERPL CALC-SCNC: 14 MMOL/L — SIGNIFICANT CHANGE UP (ref 7–14)
APTT BLD: 35.4 SEC — SIGNIFICANT CHANGE UP (ref 27–36.3)
AST SERPL-CCNC: 32 U/L — SIGNIFICANT CHANGE UP (ref 4–40)
BASOPHILS # BLD AUTO: 0.05 K/UL — SIGNIFICANT CHANGE UP (ref 0–0.2)
BASOPHILS NFR BLD AUTO: 1 % — SIGNIFICANT CHANGE UP (ref 0–2)
BILIRUB SERPL-MCNC: 1.1 MG/DL — SIGNIFICANT CHANGE UP (ref 0.2–1.2)
BUN SERPL-MCNC: 6 MG/DL — LOW (ref 7–23)
CALCIUM SERPL-MCNC: 7.8 MG/DL — LOW (ref 8.4–10.5)
CHLORIDE SERPL-SCNC: 107 MMOL/L — SIGNIFICANT CHANGE UP (ref 98–107)
CHOLEST SERPL-MCNC: 96 MG/DL — SIGNIFICANT CHANGE UP
CO2 SERPL-SCNC: 19 MMOL/L — LOW (ref 22–31)
CREAT SERPL-MCNC: 0.46 MG/DL — LOW (ref 0.5–1.3)
EGFR: 127 ML/MIN/1.73M2 — SIGNIFICANT CHANGE UP
EOSINOPHIL # BLD AUTO: 0.44 K/UL — SIGNIFICANT CHANGE UP (ref 0–0.5)
EOSINOPHIL NFR BLD AUTO: 8.6 % — HIGH (ref 0–6)
ESTIMATED AVERAGE GLUCOSE: 91 — SIGNIFICANT CHANGE UP
FERRITIN SERPL-MCNC: 161 NG/ML — SIGNIFICANT CHANGE UP (ref 30–400)
FOLATE SERPL-MCNC: 17.1 NG/ML — SIGNIFICANT CHANGE UP (ref 3.1–17.5)
GLUCOSE SERPL-MCNC: 110 MG/DL — HIGH (ref 70–99)
HCT VFR BLD CALC: 33.7 % — LOW (ref 39–50)
HDLC SERPL-MCNC: 17 MG/DL — LOW
HGB BLD-MCNC: 11.1 G/DL — LOW (ref 13–17)
IANC: 3.19 K/UL — SIGNIFICANT CHANGE UP (ref 1.8–7.4)
IMM GRANULOCYTES NFR BLD AUTO: 0.4 % — SIGNIFICANT CHANGE UP (ref 0–1.5)
INR BLD: 1.7 RATIO — HIGH (ref 0.88–1.16)
IRON SATN MFR SERPL: 23 % — SIGNIFICANT CHANGE UP (ref 14–50)
IRON SATN MFR SERPL: 71 UG/DL — SIGNIFICANT CHANGE UP (ref 45–165)
LACTATE SERPL-SCNC: 1 MMOL/L — SIGNIFICANT CHANGE UP (ref 0.5–2)
LIPID PNL WITH DIRECT LDL SERPL: 61 MG/DL — SIGNIFICANT CHANGE UP
LYMPHOCYTES # BLD AUTO: 0.98 K/UL — LOW (ref 1–3.3)
LYMPHOCYTES # BLD AUTO: 19.1 % — SIGNIFICANT CHANGE UP (ref 13–44)
MAGNESIUM SERPL-MCNC: 1.8 MG/DL — SIGNIFICANT CHANGE UP (ref 1.6–2.6)
MCHC RBC-ENTMCNC: 31 PG — SIGNIFICANT CHANGE UP (ref 27–34)
MCHC RBC-ENTMCNC: 32.9 GM/DL — SIGNIFICANT CHANGE UP (ref 32–36)
MCV RBC AUTO: 94.1 FL — SIGNIFICANT CHANGE UP (ref 80–100)
MONOCYTES # BLD AUTO: 0.44 K/UL — SIGNIFICANT CHANGE UP (ref 0–0.9)
MONOCYTES NFR BLD AUTO: 8.6 % — SIGNIFICANT CHANGE UP (ref 2–14)
NEUTROPHILS # BLD AUTO: 3.19 K/UL — SIGNIFICANT CHANGE UP (ref 1.8–7.4)
NEUTROPHILS NFR BLD AUTO: 62.3 % — SIGNIFICANT CHANGE UP (ref 43–77)
NON HDL CHOLESTEROL: 79 MG/DL — SIGNIFICANT CHANGE UP
NRBC # BLD: 0 /100 WBCS — SIGNIFICANT CHANGE UP
NRBC # FLD: 0 K/UL — SIGNIFICANT CHANGE UP
PHOSPHATE SERPL-MCNC: 2.6 MG/DL — SIGNIFICANT CHANGE UP (ref 2.5–4.5)
PLATELET # BLD AUTO: 118 K/UL — LOW (ref 150–400)
POTASSIUM SERPL-MCNC: 3.4 MMOL/L — LOW (ref 3.5–5.3)
POTASSIUM SERPL-SCNC: 3.4 MMOL/L — LOW (ref 3.5–5.3)
PROT SERPL-MCNC: 5.4 G/DL — LOW (ref 6–8.3)
PROTHROM AB SERPL-ACNC: 19.8 SEC — HIGH (ref 10.5–13.4)
RBC # BLD: 3.58 M/UL — LOW (ref 4.2–5.8)
RBC # BLD: 3.58 M/UL — LOW (ref 4.2–5.8)
RBC # FLD: 16.4 % — HIGH (ref 10.3–14.5)
RETICS #: 77.7 K/UL — SIGNIFICANT CHANGE UP (ref 25–125)
RETICS/RBC NFR: 2.2 % — SIGNIFICANT CHANGE UP (ref 0.5–2.5)
SODIUM SERPL-SCNC: 140 MMOL/L — SIGNIFICANT CHANGE UP (ref 135–145)
T PALLIDUM AB TITR SER: NEGATIVE — SIGNIFICANT CHANGE UP
TIBC SERPL-MCNC: 311 UG/DL — SIGNIFICANT CHANGE UP (ref 220–430)
TRIGL SERPL-MCNC: 89 MG/DL — SIGNIFICANT CHANGE UP
UIBC SERPL-MCNC: 240 UG/DL — SIGNIFICANT CHANGE UP (ref 110–370)
VIT B12 SERPL-MCNC: 714 PG/ML — SIGNIFICANT CHANGE UP (ref 200–900)
WBC # BLD: 5.12 K/UL — SIGNIFICANT CHANGE UP (ref 3.8–10.5)
WBC # FLD AUTO: 5.12 K/UL — SIGNIFICANT CHANGE UP (ref 3.8–10.5)

## 2022-05-05 PROCEDURE — 99239 HOSP IP/OBS DSCHRG MGMT >30: CPT | Mod: GC

## 2022-05-05 RX ORDER — ENOXAPARIN SODIUM 100 MG/ML
40 INJECTION SUBCUTANEOUS EVERY 24 HOURS
Refills: 0 | Status: DISCONTINUED | OUTPATIENT
Start: 2022-05-05 | End: 2022-05-05

## 2022-05-05 RX ORDER — SODIUM CHLORIDE 9 MG/ML
1000 INJECTION, SOLUTION INTRAVENOUS
Refills: 0 | Status: DISCONTINUED | OUTPATIENT
Start: 2022-05-05 | End: 2022-05-05

## 2022-05-05 RX ORDER — MULTIVIT-MIN/FERROUS GLUCONATE 9 MG/15 ML
1 LIQUID (ML) ORAL
Qty: 0 | Refills: 0 | DISCHARGE

## 2022-05-05 RX ORDER — POTASSIUM CHLORIDE 20 MEQ
20 PACKET (EA) ORAL ONCE
Refills: 0 | Status: COMPLETED | OUTPATIENT
Start: 2022-05-05 | End: 2022-05-05

## 2022-05-05 RX ORDER — FOLIC ACID 0.8 MG
1 TABLET ORAL
Qty: 30 | Refills: 0
Start: 2022-05-05 | End: 2022-06-03

## 2022-05-05 RX ADMIN — MEROPENEM 100 MILLIGRAM(S): 1 INJECTION INTRAVENOUS at 05:50

## 2022-05-05 RX ADMIN — SODIUM CHLORIDE 75 MILLILITER(S): 9 INJECTION, SOLUTION INTRAVENOUS at 07:50

## 2022-05-05 RX ADMIN — Medication 20 MILLIEQUIVALENT(S): at 10:37

## 2022-05-05 RX ADMIN — Medication 250 MILLIGRAM(S): at 06:32

## 2022-05-05 NOTE — DISCHARGE NOTE PROVIDER - NSDCMRMEDTOKEN_GEN_ALL_CORE_FT
Cleocin HCl 300 mg oral capsule: 1 cap(s) orally 4 times a day   folic acid 1 mg oral tablet: 1 tab(s) orally once a day

## 2022-05-05 NOTE — DISCHARGE NOTE PROVIDER - NSDCFUADDAPPT_GEN_ALL_CORE_FT
Please see dermatology tomorrow. They will call you with an appointment. If they do not please call their office and say you spoke with Dr. Wang and Tae about an appointment

## 2022-05-05 NOTE — PROGRESS NOTE ADULT - ASSESSMENT
51M PMH EtOH use (w/o hx of severe withdrawal, ICU admission, or intubation, last drink in am of admit ), daily marijuana use. depression w/ SA 2018 (not on therapy), a/w sepsis iso dermatitis initially of b/l feet now extending diffusely, derm c/s in ED c/f hypersensitivity rxn vs less likely TSS vs connective tissue dz vs adult Kawasaki dz, low suspicion SJS/TEN; s/p skin bx

## 2022-05-05 NOTE — PROGRESS NOTE ADULT - ATTENDING COMMENTS
52 yo M with PMH of EtOH use (w/o hx of severe withdrawal, ICU admission, or intubation, last drink in am of admit ), daily marijuana use. depression w/ SA in 2018 (not on therapy), a/w sepsis iso acute dermatitis with concern for superimposed cellulitis, initially of b/l feet now extending diffusely, derm c/s in ED c/f hypersensitivity rxn vs less likely TSS vs connective tissue dz vs adult Kawasaki dz, low suspicion SJS/TEN; s/p skin bx. On Vanc and Jaleesa for cellulitis and risk of other skin/soft tissue infections given desquamation and weeping wounds.   Pt expressed desire to leave AMA this morning to care for his father, pt seen and examined at bedside. Sepis now resolved, hemodynamically stable. Will f/u Derm Clinic tomorrow. Will d/c with 1 week course of Clindamycin.

## 2022-05-05 NOTE — DISCHARGE NOTE PROVIDER - NSFOLLOWUPCLINICS_GEN_ALL_ED_FT
[facility] [place]  Dermatology  [address], [address]  [place] [address]  Phone: [phone]  Fax: [phone]  Follow Up Time: 1-3 days

## 2022-05-05 NOTE — CHART NOTE - NSCHARTNOTEFT_GEN_A_CORE
Pt is endorsing a need to leave the hospital as he believes he is doing better and that he urgentyly needs to return home to take care of his father. We discussed the consequences of leaving AMA, including the possibility of death due to sepsis. The pt agrees he is willing to take that risk and will go home. Pt has full capacity and is leaving AMA.    However, we did set up dermatologic outpatient care which the patient agrees to follow up with. The pt will also take 7 days of clindamycin for empiric therapy. The pt understands that if his symptoms worsen, that he must immediately return to the ED.

## 2022-05-05 NOTE — DISCHARGE NOTE PROVIDER - NSDCCPCAREPLAN_GEN_ALL_CORE_FT
PRINCIPAL DISCHARGE DIAGNOSIS  Diagnosis: Rash  Assessment and Plan of Treatment: You came in for a rash. We discussed the consequences of leaving against medical advice and you repeated your understanding of this. I will re-iterate that as you had symptoms of fever and increased heart rate that leaving without continuing your therapy could result in death. If your symptons return or worsen please return the ED as this can be life threatening.   Please see dermatology tomorrow at [address] [place] [address] to help monitor this rash. They will call you for an appointment.  Please take the clindamycin antibiotic medication as prescribed for 7 days.      SECONDARY DISCHARGE DIAGNOSES  Diagnosis: Fever  Assessment and Plan of Treatment: If you have repeat symptoms of fever, chills, or difficulty walking please return to the ED as this can be life threatening.

## 2022-05-05 NOTE — PROGRESS NOTE ADULT - PROBLEM SELECTOR PLAN 2
erythematous, desquamating rash initiating in b/l feet then to hands w/ weeping, as well as confluent erythematous patches w/ papules diffusely (LE, UE, groin, abd, chest) admit meeting SIRS criteria (fever, tachycardia)  derm c/s in ED c/f hypersensitivity rxn (bullous tinea pedis vs COVID vax vs contact dermatitis from desitin powder) vs less likely TSS vs connective tissue dz vs adult kawasaki dz vs low suspicion SJS/TEN  - sepsis w/u as above and broad spectrum abx coverage  - f/u fungal Cx of feet, tissue bx (LUE)  - monitor sx, exam progression and contact derm stat if new desquamation, skin pain, mucosal involvement  - wound care c/s

## 2022-05-05 NOTE — PROGRESS NOTE ADULT - PROBLEM SELECTOR PLAN 7
hx of SA 2018 w/ [facility] admission, not currently on tx  reports good mood currently w/o SI/HI  - monitor sx

## 2022-05-05 NOTE — PROGRESS NOTE ADULT - SUBJECTIVE AND OBJECTIVE BOX
PROGRESS NOTE:   Authored By: Kathe Ghosh MD     PGY-1, Internal Medicine  Pager: [phone], [phone]    Patient is a 51y old  Male who presents with a chief complaint of rash (04 May 2022 20:34)      OVERNIGHT EVENTS: No acute events overnight    SUBJECTIVE: Pt seen and examined at bedside this morning.     ADDITIONAL REVIEW OF SYSTEMS:    MEDICATIONS  (STANDING):  folic acid 1 milliGRAM(s) Oral daily  heparin   Injectable 5000 Unit(s) SubCutaneous every 8 hours  lactated ringers. 1000 milliLiter(s) (75 mL/Hr) IV Continuous <Continuous>  meropenem  IVPB 1000 milliGRAM(s) IV Intermittent every 8 hours  multivitamin 1 Tablet(s) Oral daily  thiamine 100 milliGRAM(s) Oral daily  thiamine Injectable 100 milliGRAM(s) IntraMuscular once  vancomycin  IVPB 1000 milliGRAM(s) IV Intermittent every 12 hours    MEDICATIONS  (PRN):  acetaminophen     Tablet .. 650 milliGRAM(s) Oral every 6 hours PRN Temp greater or equal to 38C (100.4F), Mild Pain (1 - 3)  LORazepam     Tablet 2 milliGRAM(s) Oral every 2 hours PRN CIWA-Ar score increase by 2 points and a total score of 7 or less  LORazepam     Tablet 2 milliGRAM(s) Oral every 1 hour PRN CIWA-Ar score 8 or greater      CAPILLARY BLOOD GLUCOSE        I&O's Summary      PHYSICAL EXAM:  Vital Signs Last 24 Hrs  T(C): 36.7 (05 May 2022 04:55), Max: 38.5 (04 May 2022 16:02)  T(F): 98 (05 May 2022 04:55), Max: 101.3 (04 May 2022 16:02)  HR: 74 (05 May 2022 04:55) (74 - 105)  BP: 141/83 (05 May 2022 04:55) (116/58 - 144/79)  BP(mean): --  RR: 17 (05 May 2022 04:55) (15 - 17)  SpO2: 100% (05 May 2022 04:55) (99% - 100%)    CONSTITUTIONAL: NAD, well-developed  HEENT: NC/AT, EOMI  RESPIRATORY: No increased work of breathing, CTAB, no wheezes or crackles appreciated  CARDIOVASCULAR: RRR, S1 and S2 present, no m/r/g  ABDOMEN: soft, NT, ND, bowel sounds present  EXTREMITIES: No LE edema  MUSCULOSKELETAL: no joint swelling or tenderness to palpation  NEURO: A&Ox3, moving all extremities    LABS:                        12.7   10.25 )-----------( 138      ( 04 May 2022 16:30 )             38.2     05-04    138  |  103  |  6<L>  ----------------------------<  104<H>  3.8   |  22  |  0.49<L>    Ca    8.5      04 May 2022 16:30    TPro  6.4  /  Alb  3.9  /  TBili  1.1  /  DBili  x   /  AST  42<H>  /  ALT  20  /  AlkPhos  102  05-04    PT/INR - ( 04 May 2022 16:30 )   PT: 19.0 sec;   INR: 1.63 ratio         PTT - ( 04 May 2022 16:30 )  PTT:33.6 sec            RADIOLOGY & ADDITIONAL TESTS:    Results Reviewed:   Imaging Personally Reviewed:  Electrocardiogram Personally Reviewed:    COORDINATION OF CARE:  Care Discussed with Consultants/Other Providers [Y/N]:  Prior or Outpatient Records Reviewed [Y/N]:

## 2022-05-05 NOTE — DISCHARGE NOTE PROVIDER - HOSPITAL COURSE
Patient leaving AMA. Short form discharge summary. Pt presented with rash alongside sepsis criteria. Dermatology consulted and stated:   Per patient, rash started as few denuded spots on his b/l feet about 5 days ago. Asx. That is when he started applying Desenex powder to the area which he was doing until yesterday. About 3 days ago, he noticed it spreading to his hands. Yesterday he noticed some involvement of the groin and today he noticed little red bumps appearing over his trunk and extremities. These new bumps are mildly pruritic, otherwise patient denies any skin pruritus/discomfort/pain. No ocular, oral, or genital mucosal involvement. Patient reports feeling well overall and was surprised to be told he had a fever while in the hospital. No prior history of skin rashes.       PHYSICAL EXAM:  Skin exam notable for:  All ten toes with erythematous patches with overlying areas of moist desquamation, most prominent in the web spaces  Soles of b/l feet with pink macerated patches and areas of denudation  B/l palms with macerated pink patches with areas of denudation  Khadijah-anal and inguinal fold area with pink patches and peripherally scattered pink papules    - Differential diagnosis remains broad at this time. Favor a hypersensitivity reaction (either to bullous tinea pedis vs to COVID booster vaccine vs to contact dermatitis patient developed from use of foot powder) vs less likely Toxic Shock Syndrome vs Connective Tissue Disease vs Adult Kawasaki Disease. Very low suspicion for SJS/TEN at this time given overall well appearing nature of patient, lack of mucosal involvement, and distribution and morphology of rash.      Pt states he is feeling better and needs to go home and take care of his dad. He understands this is AMA and understands the consequences, including possible death from worsening sepsis. Pt aware he has a derm appointment tomorrow on 5/6 and agrees that he will go. We are d/c'ing the pt on clindamycin for empiric therapy. Patient leaving AMA    52 yo M with PMH of EtOH use (w/o hx of severe withdrawal, ICU admission, or intubation), daily marijuana use, depression w/ SA in 2018 (not on therapy), who p/w rash. Pt reports being in baseline state of health when 5 days ago he experienced sudden onset rash on b/l feet, w/o associated pain, pruritis, or numbness. He attempted palliation of sx w/ Desitin powder w/o success. Next, the area of the rash began to weep, and he noticed a rash of similar character on b/l hands. Further, he began to experience a rash of different character including red bumps which occurred in b/l LE, groin, buttocks, abdomen, and back, also w/o pain, pruritis, or numbness. He has decreased PO intake iso difficulty ambulating to kitchen iso b/l foot rash. Pt felt cold today w/o shaking chills, prompting presentation to ED. Otherwise he denies fevers, visual disturbance, eye pain, rhinorrhea, dysphagia, odynophagia, CP, palpitations, SOB, cough, abdominal pain, n/v/d, melena, hematochezia, dysuria, hematuria, numbness/tingling/weakness, SI or HI. He denies recent travel or sick contact; no use of new creams, detergents, perfume, air freshener; no outdoor activities or insect bites. He is not sexually active, and denies any rash/penile discharge. Of note, no episodes of similar severity in past, however he reports lifetime hx of sensitive skin which easily breaks into rash, for which he uses mild detergents/soaps. Also, he reports that he routinely uses extremely hot water to wash his hands, however denies bathing entire body in hot water. Recent Pfizer COVID-19 booster 1 wk ago.    Dermatology consulted and stated:   Per patient, rash started as few denuded spots on his b/l feet about 5 days ago. Asx. That is when he started applying Desenex powder to the area which he was doing until yesterday. About 3 days ago, he noticed it spreading to his hands. Yesterday he noticed some involvement of the groin and today he noticed little red bumps appearing over his trunk and extremities. These new bumps are mildly pruritic, otherwise patient denies any skin pruritus/discomfort/pain. No ocular, oral, or genital mucosal involvement. Patient reports feeling well overall and was surprised to be told he had a fever while in the hospital. No prior history of skin rashes.       PHYSICAL EXAM:  Skin exam notable for:  All ten toes with erythematous patches with overlying areas of moist desquamation, most prominent in the web spaces  Soles of b/l feet with pink macerated patches and areas of denudation  B/l palms with macerated pink patches with areas of denudation  Khadijah-anal and inguinal fold area with pink patches and peripherally scattered pink papules    - Differential diagnosis remains broad at this time. Favor a hypersensitivity reaction (either to bullous tinea pedis vs to COVID booster vaccine vs to contact dermatitis patient developed from use of foot powder) vs less likely Toxic Shock Syndrome vs Connective Tissue Disease vs Adult Kawasaki Disease. Very low suspicion for SJS/TEN at this time given overall well appearing nature of patient, lack of mucosal involvement, and distribution and morphology of rash.      Pt states he is feeling better and needs to go home and take care of his dad. He understands this is AMA and understands the consequences, including possible death from worsening sepsis. Pt aware he has a derm appointment tomorrow on 5/6 and agrees that he will go. We are d/c'ing the pt on clindamycin for empiric therapy.

## 2022-05-05 NOTE — PROGRESS NOTE ADULT - PROBLEM SELECTOR PLAN 1
meets SIRS (febrile, tachycardic) w/ lactate elevation 2.3 iso dermatitis, poor po intake, insensible losses of wounds  WBC wnl  s/p hardy, clinda, IVF in ED  - broad spectrum abx vanc, hardy iso PCN allergy, narrow pending w/u  - f/u BCx, UA, UCx  - rpt am lactate  - monitor WBC, fever curve  - IVF LR 75cc/hr

## 2022-05-05 NOTE — PROGRESS NOTE ADULT - PROBLEM SELECTOR PLAN 6
EtOH use 3-4/wk, 14 drink/wk on avg  denies withdrawal sx   - sx triggered ativan, monitor CIWA score  - thiamine and folate supplementation  - SBIRT c/s

## 2022-05-06 ENCOUNTER — APPOINTMENT (OUTPATIENT)
Dept: DERMATOLOGY | Facility: CLINIC | Age: 52
End: 2022-05-06
Payer: MEDICAID

## 2022-05-06 DIAGNOSIS — L30.2 CUTANEOUS AUTOSENSITIZATION: ICD-10-CM

## 2022-05-06 DIAGNOSIS — L30.9 DERMATITIS, UNSPECIFIED: ICD-10-CM

## 2022-05-06 PROBLEM — Z87.898 PERSONAL HISTORY OF OTHER SPECIFIED CONDITIONS: Chronic | Status: ACTIVE | Noted: 2022-05-04

## 2022-05-06 PROBLEM — Z00.00 ENCOUNTER FOR PREVENTIVE HEALTH EXAMINATION: Status: ACTIVE | Noted: 2022-05-06

## 2022-05-06 PROBLEM — T14.91XA SUICIDE ATTEMPT, INITIAL ENCOUNTER: Chronic | Status: ACTIVE | Noted: 2022-05-04

## 2022-05-06 PROCEDURE — 99204 OFFICE O/P NEW MOD 45 MIN: CPT | Mod: GC

## 2022-05-06 RX ORDER — TRIAMCINOLONE ACETONIDE 1 MG/G
0.1 CREAM TOPICAL
Qty: 1 | Refills: 1 | Status: ACTIVE | COMMUNITY
Start: 2022-05-06 | End: 1900-01-01

## 2022-05-06 RX ORDER — TERBINAFINE HYDROCHLORIDE 1 G/100G
1 CREAM TOPICAL
Qty: 1 | Refills: 1 | Status: ACTIVE | COMMUNITY
Start: 2022-05-06 | End: 1900-01-01

## 2022-05-09 LAB
CULTURE RESULTS: SIGNIFICANT CHANGE UP
CULTURE RESULTS: SIGNIFICANT CHANGE UP
SPECIMEN SOURCE: SIGNIFICANT CHANGE UP
SPECIMEN SOURCE: SIGNIFICANT CHANGE UP

## 2022-05-13 LAB — PYRIDOXAL PHOS SERPL-MCNC: 7.7 UG/L — SIGNIFICANT CHANGE UP (ref 3.4–65.2)

## 2022-05-20 LAB — SURGICAL PATHOLOGY STUDY: SIGNIFICANT CHANGE UP

## 2022-05-23 ENCOUNTER — NON-APPOINTMENT (OUTPATIENT)
Age: 52
End: 2022-05-23

## 2022-06-02 ENCOUNTER — APPOINTMENT (OUTPATIENT)
Dept: DERMATOLOGY | Facility: CLINIC | Age: 52
End: 2022-06-02

## 2022-06-03 ENCOUNTER — NON-APPOINTMENT (OUTPATIENT)
Age: 52
End: 2022-06-03

## 2023-09-14 NOTE — ED BEHAVIORAL HEALTH ASSESSMENT NOTE - DESCRIPTION (FIRST USE, LAST USE, QUANTITY, FREQUENCY, DURATION)
drinks 14 beers per day [id number]; last drank early this morning; reports he has not had significant periods of sobriety infrequent use, utox+ Detail Level: Detailed Plan: Recommend pt apply ear clip to affected ear once area has healed. Initiate Treatment: imiquimod 5 % topical cream packet:  In 2 weeks, apply to scar on ear once daily every other day for 6 weeks

## 2024-07-09 ENCOUNTER — EMERGENCY (EMERGENCY)
Facility: HOSPITAL | Age: 54
LOS: 1 days | Discharge: HOME | End: 2024-07-09
Attending: STUDENT IN AN ORGANIZED HEALTH CARE EDUCATION/TRAINING PROGRAM
Payer: MEDICAID

## 2024-07-09 VITALS
TEMPERATURE: 98 F | OXYGEN SATURATION: 98 % | RESPIRATION RATE: 18 BRPM | SYSTOLIC BLOOD PRESSURE: 133 MMHG | HEART RATE: 74 BPM | DIASTOLIC BLOOD PRESSURE: 80 MMHG

## 2024-07-09 VITALS
HEART RATE: 85 BPM | WEIGHT: 164.91 LBS | HEIGHT: 69 IN | TEMPERATURE: 98 F | SYSTOLIC BLOOD PRESSURE: 142 MMHG | RESPIRATION RATE: 20 BRPM | DIASTOLIC BLOOD PRESSURE: 80 MMHG | OXYGEN SATURATION: 100 %

## 2024-07-09 DIAGNOSIS — F31.30 BIPOLAR DISORDER, CURRENT EPISODE DEPRESSED, MILD OR MODERATE SEVERITY, UNSPECIFIED: ICD-10-CM

## 2024-07-09 DIAGNOSIS — Z98.890 OTHER SPECIFIED POSTPROCEDURAL STATES: Chronic | ICD-10-CM

## 2024-07-09 DIAGNOSIS — Z87.828 PERSONAL HISTORY OF OTHER (HEALED) PHYSICAL INJURY AND TRAUMA: Chronic | ICD-10-CM

## 2024-07-09 DIAGNOSIS — F10.90 ALCOHOL USE, UNSPECIFIED, UNCOMPLICATED: ICD-10-CM

## 2024-07-09 DIAGNOSIS — F32.A DEPRESSION, UNSPECIFIED: ICD-10-CM

## 2024-07-09 DIAGNOSIS — T14.91XA SUICIDE ATTEMPT, INITIAL ENCOUNTER: ICD-10-CM

## 2024-07-09 LAB
ALBUMIN SERPL ELPH-MCNC: 4.5 G/DL — SIGNIFICANT CHANGE UP (ref 3.3–5)
ALP SERPL-CCNC: 100 U/L — SIGNIFICANT CHANGE UP (ref 40–120)
ALT FLD-CCNC: 24 U/L — SIGNIFICANT CHANGE UP (ref 10–45)
AMPHET UR-MCNC: NEGATIVE — SIGNIFICANT CHANGE UP
ANION GAP SERPL CALC-SCNC: 14 MMOL/L — SIGNIFICANT CHANGE UP (ref 5–17)
APAP SERPL-MCNC: <15 UG/ML — SIGNIFICANT CHANGE UP (ref 10–30)
APAP SERPL-MCNC: <15 UG/ML — SIGNIFICANT CHANGE UP (ref 10–30)
APPEARANCE UR: CLEAR — SIGNIFICANT CHANGE UP
AST SERPL-CCNC: 39 U/L — SIGNIFICANT CHANGE UP (ref 10–40)
BARBITURATES UR SCN-MCNC: NEGATIVE — SIGNIFICANT CHANGE UP
BASOPHILS # BLD AUTO: 0.06 K/UL — SIGNIFICANT CHANGE UP (ref 0–0.2)
BASOPHILS NFR BLD AUTO: 0.9 % — SIGNIFICANT CHANGE UP (ref 0–2)
BENZODIAZ UR-MCNC: NEGATIVE — SIGNIFICANT CHANGE UP
BILIRUB SERPL-MCNC: 0.6 MG/DL — SIGNIFICANT CHANGE UP (ref 0.2–1.2)
BILIRUB UR-MCNC: NEGATIVE — SIGNIFICANT CHANGE UP
BUN SERPL-MCNC: 12 MG/DL — SIGNIFICANT CHANGE UP (ref 7–23)
CALCIUM SERPL-MCNC: 9.8 MG/DL — SIGNIFICANT CHANGE UP (ref 8.4–10.5)
CHLORIDE SERPL-SCNC: 105 MMOL/L — SIGNIFICANT CHANGE UP (ref 96–108)
CO2 SERPL-SCNC: 21 MMOL/L — LOW (ref 22–31)
COCAINE METAB.OTHER UR-MCNC: NEGATIVE — SIGNIFICANT CHANGE UP
COLOR SPEC: YELLOW — SIGNIFICANT CHANGE UP
CREAT SERPL-MCNC: 0.67 MG/DL — SIGNIFICANT CHANGE UP (ref 0.5–1.3)
DIFF PNL FLD: NEGATIVE — SIGNIFICANT CHANGE UP
EGFR: 112 ML/MIN/1.73M2 — SIGNIFICANT CHANGE UP
EOSINOPHIL # BLD AUTO: 0.11 K/UL — SIGNIFICANT CHANGE UP (ref 0–0.5)
EOSINOPHIL NFR BLD AUTO: 1.7 % — SIGNIFICANT CHANGE UP (ref 0–6)
ETHANOL SERPL-MCNC: <10 MG/DL — SIGNIFICANT CHANGE UP (ref 0–10)
FLUAV AG NPH QL: SIGNIFICANT CHANGE UP
FLUBV AG NPH QL: SIGNIFICANT CHANGE UP
GLUCOSE SERPL-MCNC: 121 MG/DL — HIGH (ref 70–99)
GLUCOSE UR QL: NEGATIVE MG/DL — SIGNIFICANT CHANGE UP
HCT VFR BLD CALC: 35 % — LOW (ref 39–50)
HGB BLD-MCNC: 10.1 G/DL — LOW (ref 13–17)
IMM GRANULOCYTES NFR BLD AUTO: 0.2 % — SIGNIFICANT CHANGE UP (ref 0–0.9)
KETONES UR-MCNC: ABNORMAL MG/DL
LEUKOCYTE ESTERASE UR-ACNC: NEGATIVE — SIGNIFICANT CHANGE UP
LYMPHOCYTES # BLD AUTO: 1.19 K/UL — SIGNIFICANT CHANGE UP (ref 1–3.3)
LYMPHOCYTES # BLD AUTO: 18.4 % — SIGNIFICANT CHANGE UP (ref 13–44)
MCHC RBC-ENTMCNC: 22.8 PG — LOW (ref 27–34)
MCHC RBC-ENTMCNC: 28.9 GM/DL — LOW (ref 32–36)
MCV RBC AUTO: 79 FL — LOW (ref 80–100)
METHADONE UR-MCNC: NEGATIVE — SIGNIFICANT CHANGE UP
MONOCYTES # BLD AUTO: 0.52 K/UL — SIGNIFICANT CHANGE UP (ref 0–0.9)
MONOCYTES NFR BLD AUTO: 8 % — SIGNIFICANT CHANGE UP (ref 2–14)
NEUTROPHILS # BLD AUTO: 4.58 K/UL — SIGNIFICANT CHANGE UP (ref 1.8–7.4)
NEUTROPHILS NFR BLD AUTO: 70.8 % — SIGNIFICANT CHANGE UP (ref 43–77)
NITRITE UR-MCNC: NEGATIVE — SIGNIFICANT CHANGE UP
NRBC # BLD: 0 /100 WBCS — SIGNIFICANT CHANGE UP (ref 0–0)
OPIATES UR-MCNC: NEGATIVE — SIGNIFICANT CHANGE UP
OXYCODONE UR-MCNC: NEGATIVE — SIGNIFICANT CHANGE UP
PCP SPEC-MCNC: SIGNIFICANT CHANGE UP
PCP UR-MCNC: NEGATIVE — SIGNIFICANT CHANGE UP
PH UR: 5.5 — SIGNIFICANT CHANGE UP (ref 5–8)
PLATELET # BLD AUTO: 157 K/UL — SIGNIFICANT CHANGE UP (ref 150–400)
POTASSIUM SERPL-MCNC: 3.8 MMOL/L — SIGNIFICANT CHANGE UP (ref 3.5–5.3)
POTASSIUM SERPL-SCNC: 3.8 MMOL/L — SIGNIFICANT CHANGE UP (ref 3.5–5.3)
PROT SERPL-MCNC: 7.6 G/DL — SIGNIFICANT CHANGE UP (ref 6–8.3)
PROT UR-MCNC: NEGATIVE MG/DL — SIGNIFICANT CHANGE UP
RBC # BLD: 4.43 M/UL — SIGNIFICANT CHANGE UP (ref 4.2–5.8)
RBC # FLD: 19.4 % — HIGH (ref 10.3–14.5)
RSV RNA NPH QL NAA+NON-PROBE: SIGNIFICANT CHANGE UP
SALICYLATES SERPL-MCNC: <2 MG/DL — LOW (ref 15–30)
SARS-COV-2 RNA SPEC QL NAA+PROBE: SIGNIFICANT CHANGE UP
SODIUM SERPL-SCNC: 140 MMOL/L — SIGNIFICANT CHANGE UP (ref 135–145)
SP GR SPEC: 1.02 — SIGNIFICANT CHANGE UP (ref 1–1.03)
THC UR QL: POSITIVE
UROBILINOGEN FLD QL: 0.2 MG/DL — SIGNIFICANT CHANGE UP (ref 0.2–1)
WBC # BLD: 6.47 K/UL — SIGNIFICANT CHANGE UP (ref 3.8–10.5)
WBC # FLD AUTO: 6.47 K/UL — SIGNIFICANT CHANGE UP (ref 3.8–10.5)

## 2024-07-09 PROCEDURE — 81003 URINALYSIS AUTO W/O SCOPE: CPT

## 2024-07-09 PROCEDURE — 70498 CT ANGIOGRAPHY NECK: CPT | Mod: 26,MC

## 2024-07-09 PROCEDURE — 70496 CT ANGIOGRAPHY HEAD: CPT | Mod: MC

## 2024-07-09 PROCEDURE — 72125 CT NECK SPINE W/O DYE: CPT | Mod: 26,MC

## 2024-07-09 PROCEDURE — 36415 COLL VENOUS BLD VENIPUNCTURE: CPT

## 2024-07-09 PROCEDURE — 72125 CT NECK SPINE W/O DYE: CPT | Mod: MC

## 2024-07-09 PROCEDURE — 93005 ELECTROCARDIOGRAM TRACING: CPT

## 2024-07-09 PROCEDURE — 70450 CT HEAD/BRAIN W/O DYE: CPT | Mod: 26,59,MC

## 2024-07-09 PROCEDURE — 70496 CT ANGIOGRAPHY HEAD: CPT | Mod: 26,MC

## 2024-07-09 PROCEDURE — 99285 EMERGENCY DEPT VISIT HI MDM: CPT

## 2024-07-09 PROCEDURE — 85025 COMPLETE CBC W/AUTO DIFF WBC: CPT

## 2024-07-09 PROCEDURE — 99284 EMERGENCY DEPT VISIT MOD MDM: CPT

## 2024-07-09 PROCEDURE — 99285 EMERGENCY DEPT VISIT HI MDM: CPT | Mod: 25

## 2024-07-09 PROCEDURE — 80053 COMPREHEN METABOLIC PANEL: CPT

## 2024-07-09 PROCEDURE — 80307 DRUG TEST PRSMV CHEM ANLYZR: CPT

## 2024-07-09 PROCEDURE — 87637 SARSCOV2&INF A&B&RSV AMP PRB: CPT

## 2024-07-09 PROCEDURE — 96374 THER/PROPH/DIAG INJ IV PUSH: CPT | Mod: XU

## 2024-07-09 PROCEDURE — 71046 X-RAY EXAM CHEST 2 VIEWS: CPT

## 2024-07-09 PROCEDURE — 70498 CT ANGIOGRAPHY NECK: CPT | Mod: MC

## 2024-07-09 PROCEDURE — 70450 CT HEAD/BRAIN W/O DYE: CPT | Mod: MC

## 2024-07-09 PROCEDURE — 87086 URINE CULTURE/COLONY COUNT: CPT

## 2024-07-09 PROCEDURE — 71046 X-RAY EXAM CHEST 2 VIEWS: CPT | Mod: 26

## 2024-07-09 RX ORDER — LORAZEPAM 0.5 MG
2 TABLET ORAL
Refills: 0 | Status: DISCONTINUED | OUTPATIENT
Start: 2024-07-09 | End: 2024-07-09

## 2024-07-09 RX ORDER — THIAMINE HCL 100 MG
500 TABLET ORAL ONCE
Refills: 0 | Status: COMPLETED | OUTPATIENT
Start: 2024-07-09 | End: 2024-07-09

## 2024-07-09 RX ORDER — FOLIC ACID
1 POWDER (GRAM) MISCELLANEOUS ONCE
Refills: 0 | Status: COMPLETED | OUTPATIENT
Start: 2024-07-09 | End: 2024-07-09

## 2024-07-09 RX ADMIN — Medication 105 MILLIGRAM(S): at 11:12

## 2024-07-09 RX ADMIN — Medication 1 MILLIGRAM(S): at 09:46

## 2024-07-10 LAB
CULTURE RESULTS: SIGNIFICANT CHANGE UP
SPECIMEN SOURCE: SIGNIFICANT CHANGE UP

## 2024-08-06 ENCOUNTER — TRANSCRIPTION ENCOUNTER (OUTPATIENT)
Age: 54
End: 2024-08-06

## 2024-08-06 NOTE — ED ADULT TRIAGE NOTE - NS ED NOTE AC HIGH RISK COUNTRIES
Wait to take mealtime insulin until fork is in hand and food is dished up.     Meal Size:  Large meal: 8 units Novolog   Average meal: 6 units Novolog  Low carb meal: 2 units Novolog    CHECKING BLOOD SUGARS:  --Check your blood sugars first thing in the morning, before meals, before/after exercise, and at bedtime.   --Check blood sugars before driving. Do NOT drive if your blood sugars are below 90 or if you could have a low blood sugar.   --If your blood sugars are less than 70 or persistently above 300 please let our office know.    HYPOGLYCEMIA:  -- If your blood sugars are less than 70 or you feel low take 3 glucose tablets and recheck blood sugars in 15 minutes.  --Check blood sugars before driving. Do not drive if your blood sugars are below 90 or if you expect them to drop below 90.  --Put 3-4 glucose tablets in baggies so you can easily grab them if needed. Put them next to your bed, in your car, purse, etc.  --We will prescribe glucagon for you. This is like an epipen but for low blood sugars. If you are too confused to safely eat/drink or if you are unconscious, this is a shot/spray that others can give you to help your blood sugars come up. Make sure the people around you know what glucagon is, where to find it, when to give it, and to administer it.     MEDICINE STORAGE:  --Avoid leaving medication in the car or anywhere it could become very hot or freeze. If insulin is ever very hot or frozen it will no longer work.  --Once your insulin pen is open, you can store it at room temperature. Please keep your unopened insulin in the refrigerator.    CONTINUOUS GLUCOSE MONITORS FALLING OFF:  --if your continuous glucose monitor (Dexcom) falls off before it is scheduled to, call the company (Dexcom) and let them know. They may ask you to mail them the defunct sensor. They should replace the sensors free of charge.  --if your continuous glucose monitor (FreeStyle Lucas or Dexcom) continues to fall off, try  applying SkinTac to your skin prior to putting on a new sensor. This can help them stick on better. There are also cover patches that are available on [employer].  --please let me know if you develop any skin problems around your FreeStyle or Dexcom site.    MEDICAL ALERT:  --get a medical alert bracelet/necklace with your name, emergency contact phone number, and medical condition (ie T1DM)   No